# Patient Record
Sex: MALE | Race: WHITE | Employment: OTHER | URBAN - METROPOLITAN AREA
[De-identification: names, ages, dates, MRNs, and addresses within clinical notes are randomized per-mention and may not be internally consistent; named-entity substitution may affect disease eponyms.]

---

## 2021-12-03 ENCOUNTER — APPOINTMENT (OUTPATIENT)
Dept: GENERAL RADIOLOGY | Age: 86
DRG: 177 | End: 2021-12-03
Payer: COMMERCIAL

## 2021-12-03 ENCOUNTER — HOSPITAL ENCOUNTER (INPATIENT)
Age: 86
LOS: 4 days | Discharge: NURSING, REHAB OR LONG-TERM CARE | DRG: 177 | End: 2021-12-08
Attending: EMERGENCY MEDICINE | Admitting: STUDENT IN AN ORGANIZED HEALTH CARE EDUCATION/TRAINING PROGRAM
Payer: COMMERCIAL

## 2021-12-03 DIAGNOSIS — J18.9 PNEUMONIA OF LEFT LOWER LOBE DUE TO INFECTIOUS ORGANISM: ICD-10-CM

## 2021-12-03 DIAGNOSIS — U07.1 COVID: Primary | ICD-10-CM

## 2021-12-03 PROBLEM — C21.0 ANAL CANCER (HCC): Status: ACTIVE | Noted: 2021-12-03

## 2021-12-03 LAB
-: ABNORMAL
ABSOLUTE EOS #: 0 K/UL (ref 0–0.44)
ABSOLUTE IMMATURE GRANULOCYTE: 0 K/UL (ref 0–0.3)
ABSOLUTE LYMPH #: 0.25 K/UL (ref 1.1–3.7)
ABSOLUTE MONO #: 0.49 K/UL (ref 0.1–1.2)
AMORPHOUS: ABNORMAL
ANION GAP SERPL CALCULATED.3IONS-SCNC: 12 MMOL/L (ref 9–17)
BACTERIA: ABNORMAL
BASOPHILS # BLD: 0 % (ref 0–2)
BASOPHILS ABSOLUTE: 0 K/UL (ref 0–0.2)
BILIRUBIN URINE: NEGATIVE
BNP INTERPRETATION: ABNORMAL
BUN BLDV-MCNC: 43 MG/DL (ref 8–23)
BUN/CREAT BLD: 18 (ref 9–20)
CALCIUM SERPL-MCNC: 9.6 MG/DL (ref 8.6–10.4)
CASTS UA: ABNORMAL /LPF
CHLORIDE BLD-SCNC: 96 MMOL/L (ref 98–107)
CO2: 25 MMOL/L (ref 20–31)
COLOR: YELLOW
COMMENT UA: ABNORMAL
CREAT SERPL-MCNC: 2.41 MG/DL (ref 0.7–1.2)
CRYSTALS, UA: ABNORMAL /HPF
DIFFERENTIAL TYPE: ABNORMAL
EOSINOPHILS RELATIVE PERCENT: 0 % (ref 1–4)
EPITHELIAL CELLS UA: ABNORMAL /HPF (ref 0–5)
FERRITIN: 102 UG/L (ref 30–400)
GFR AFRICAN AMERICAN: 31 ML/MIN
GFR NON-AFRICAN AMERICAN: 26 ML/MIN
GFR SERPL CREATININE-BSD FRML MDRD: ABNORMAL ML/MIN/{1.73_M2}
GFR SERPL CREATININE-BSD FRML MDRD: ABNORMAL ML/MIN/{1.73_M2}
GLUCOSE BLD-MCNC: 314 MG/DL (ref 70–99)
GLUCOSE URINE: ABNORMAL
HCT VFR BLD CALC: 41 % (ref 40.7–50.3)
HEMOGLOBIN: 12.9 G/DL (ref 13–17)
IMMATURE GRANULOCYTES: 0 %
KETONES, URINE: ABNORMAL
LACTATE DEHYDROGENASE: 224 U/L (ref 135–225)
LACTIC ACID, SEPSIS WHOLE BLOOD: NORMAL MMOL/L (ref 0.5–1.9)
LACTIC ACID, SEPSIS WHOLE BLOOD: NORMAL MMOL/L (ref 0.5–1.9)
LACTIC ACID, SEPSIS: 0.9 MMOL/L (ref 0.5–1.9)
LACTIC ACID, SEPSIS: 1.5 MMOL/L (ref 0.5–1.9)
LEUKOCYTE ESTERASE, URINE: NEGATIVE
LYMPHOCYTES # BLD: 3 % (ref 24–43)
MAGNESIUM: 2.4 MG/DL (ref 1.6–2.6)
MCH RBC QN AUTO: 28.4 PG (ref 25.2–33.5)
MCHC RBC AUTO-ENTMCNC: 31.5 G/DL (ref 28.4–34.8)
MCV RBC AUTO: 90.1 FL (ref 82.6–102.9)
MONOCYTES # BLD: 6 % (ref 3–12)
MUCUS: ABNORMAL
MYOGLOBIN: 224 NG/ML (ref 28–72)
MYOGLOBIN: 247 NG/ML (ref 28–72)
NITRITE, URINE: NEGATIVE
NRBC AUTOMATED: 0 PER 100 WBC
OTHER OBSERVATIONS UA: ABNORMAL
PDW BLD-RTO: 14.8 % (ref 11.8–14.4)
PH UA: 6 (ref 5–8)
PLATELET # BLD: 262 K/UL (ref 138–453)
PLATELET ESTIMATE: ABNORMAL
PMV BLD AUTO: 9.8 FL (ref 8.1–13.5)
POTASSIUM SERPL-SCNC: 4.3 MMOL/L (ref 3.7–5.3)
PRO-BNP: 1295 PG/ML
PROTEIN UA: ABNORMAL
RBC # BLD: 4.55 M/UL (ref 4.21–5.77)
RBC # BLD: ABNORMAL 10*6/UL
RBC UA: ABNORMAL /HPF (ref 0–2)
RENAL EPITHELIAL, UA: ABNORMAL /HPF
SARS-COV-2, RAPID: DETECTED
SEG NEUTROPHILS: 91 % (ref 36–65)
SEGMENTED NEUTROPHILS ABSOLUTE COUNT: 7.46 K/UL (ref 1.5–8.1)
SODIUM BLD-SCNC: 133 MMOL/L (ref 135–144)
SPECIFIC GRAVITY UA: 1.02 (ref 1–1.03)
SPECIMEN DESCRIPTION: ABNORMAL
THYROXINE, FREE: 0.93 NG/DL (ref 0.93–1.7)
TRICHOMONAS: ABNORMAL
TROPONIN INTERP: ABNORMAL
TROPONIN INTERP: ABNORMAL
TROPONIN T: ABNORMAL NG/ML
TROPONIN T: ABNORMAL NG/ML
TROPONIN, HIGH SENSITIVITY: 33 NG/L (ref 0–22)
TROPONIN, HIGH SENSITIVITY: 34 NG/L (ref 0–22)
TSH SERPL DL<=0.05 MIU/L-ACNC: 5.22 MIU/L (ref 0.3–5)
TURBIDITY: CLEAR
URINE HGB: ABNORMAL
UROBILINOGEN, URINE: NORMAL
WBC # BLD: 8.2 K/UL (ref 3.5–11.3)
WBC # BLD: ABNORMAL 10*3/UL
WBC UA: ABNORMAL /HPF (ref 0–5)
YEAST: ABNORMAL

## 2021-12-03 PROCEDURE — 83874 ASSAY OF MYOGLOBIN: CPT

## 2021-12-03 PROCEDURE — 84443 ASSAY THYROID STIM HORMONE: CPT

## 2021-12-03 PROCEDURE — 99285 EMERGENCY DEPT VISIT HI MDM: CPT

## 2021-12-03 PROCEDURE — 83880 ASSAY OF NATRIURETIC PEPTIDE: CPT

## 2021-12-03 PROCEDURE — 83735 ASSAY OF MAGNESIUM: CPT

## 2021-12-03 PROCEDURE — 83605 ASSAY OF LACTIC ACID: CPT

## 2021-12-03 PROCEDURE — 80048 BASIC METABOLIC PNL TOTAL CA: CPT

## 2021-12-03 PROCEDURE — 82728 ASSAY OF FERRITIN: CPT

## 2021-12-03 PROCEDURE — 71045 X-RAY EXAM CHEST 1 VIEW: CPT

## 2021-12-03 PROCEDURE — 83615 LACTATE (LD) (LDH) ENZYME: CPT

## 2021-12-03 PROCEDURE — 6360000002 HC RX W HCPCS: Performed by: NURSE PRACTITIONER

## 2021-12-03 PROCEDURE — 84484 ASSAY OF TROPONIN QUANT: CPT

## 2021-12-03 PROCEDURE — 93005 ELECTROCARDIOGRAM TRACING: CPT | Performed by: NURSE PRACTITIONER

## 2021-12-03 PROCEDURE — 99222 1ST HOSP IP/OBS MODERATE 55: CPT | Performed by: NURSE PRACTITIONER

## 2021-12-03 PROCEDURE — 87040 BLOOD CULTURE FOR BACTERIA: CPT

## 2021-12-03 PROCEDURE — 2580000003 HC RX 258: Performed by: NURSE PRACTITIONER

## 2021-12-03 PROCEDURE — 86140 C-REACTIVE PROTEIN: CPT

## 2021-12-03 PROCEDURE — 96375 TX/PRO/DX INJ NEW DRUG ADDON: CPT

## 2021-12-03 PROCEDURE — 84145 PROCALCITONIN (PCT): CPT

## 2021-12-03 PROCEDURE — 96365 THER/PROPH/DIAG IV INF INIT: CPT

## 2021-12-03 PROCEDURE — 96367 TX/PROPH/DG ADDL SEQ IV INF: CPT

## 2021-12-03 PROCEDURE — 84439 ASSAY OF FREE THYROXINE: CPT

## 2021-12-03 PROCEDURE — 81001 URINALYSIS AUTO W/SCOPE: CPT

## 2021-12-03 PROCEDURE — 83036 HEMOGLOBIN GLYCOSYLATED A1C: CPT

## 2021-12-03 PROCEDURE — 87635 SARS-COV-2 COVID-19 AMP PRB: CPT

## 2021-12-03 PROCEDURE — 85025 COMPLETE CBC W/AUTO DIFF WBC: CPT

## 2021-12-03 RX ORDER — GLIMEPIRIDE 4 MG/1
4 TABLET ORAL
COMMUNITY
Start: 2021-11-12

## 2021-12-03 RX ORDER — LEVOTHYROXINE SODIUM 88 UG/1
88 TABLET ORAL DAILY
COMMUNITY
Start: 2021-10-06

## 2021-12-03 RX ORDER — TOLTERODINE 4 MG/1
4 CAPSULE, EXTENDED RELEASE ORAL DAILY
COMMUNITY
Start: 2021-07-15

## 2021-12-03 RX ORDER — GABAPENTIN 300 MG/1
CAPSULE ORAL
COMMUNITY
End: 2021-12-04

## 2021-12-03 RX ORDER — PANTOPRAZOLE SODIUM 40 MG/1
40 TABLET, DELAYED RELEASE ORAL
COMMUNITY
Start: 2021-08-30

## 2021-12-03 RX ORDER — SODIUM CHLORIDE 9 MG/ML
INJECTION, SOLUTION INTRAVENOUS CONTINUOUS
Status: DISCONTINUED | OUTPATIENT
Start: 2021-12-03 | End: 2021-12-04

## 2021-12-03 RX ORDER — DEXAMETHASONE SODIUM PHOSPHATE 10 MG/ML
6 INJECTION, SOLUTION INTRAMUSCULAR; INTRAVENOUS ONCE
Status: COMPLETED | OUTPATIENT
Start: 2021-12-03 | End: 2021-12-03

## 2021-12-03 RX ADMIN — AZITHROMYCIN MONOHYDRATE 500 MG: 500 INJECTION, POWDER, LYOPHILIZED, FOR SOLUTION INTRAVENOUS at 22:21

## 2021-12-03 RX ADMIN — DEXAMETHASONE SODIUM PHOSPHATE 6 MG: 10 INJECTION INTRAMUSCULAR; INTRAVENOUS at 22:21

## 2021-12-03 RX ADMIN — CEFTRIAXONE SODIUM 1000 MG: 1 INJECTION, POWDER, FOR SOLUTION INTRAMUSCULAR; INTRAVENOUS at 21:41

## 2021-12-03 RX ADMIN — SODIUM CHLORIDE: 9 INJECTION, SOLUTION INTRAVENOUS at 18:52

## 2021-12-03 ASSESSMENT — ENCOUNTER SYMPTOMS
ALLERGIC/IMMUNOLOGIC NEGATIVE: 1
DIARRHEA: 0
EYES NEGATIVE: 1
VOMITING: 0
COUGH: 0
ABDOMINAL PAIN: 0
WHEEZING: 0
NAUSEA: 0
SHORTNESS OF BREATH: 0

## 2021-12-03 NOTE — ED PROVIDER NOTES
[address] [place] [address] ED  EMERGENCY DEPARTMENT ENCOUNTER      Pt Name: Osito Steward  MRN: [id number]  Armsjuliangfbella 1935  Date of evaluation: 12/3/2021  Provider: Jordan Valencia       Chief Complaint   Patient presents with    Fever     per EMS    Fatigue     last 2-3 days. HISTORY OFPRESENT ILLNESS  (Location/Symptom, Timing/Onset, Context/Setting, Quality, Duration, Modifying Factors, Severity.)   Osito Steward is a 81 y.o. male who presents to the emergency department by EMS for evaluation of fatigue and generalized weakness for the past 2-3 days. EMS also reported patient had a fever. He is afebrile upon arrival.  Temp 98.1. Patient is extremely hard of hearing. 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 No acute distress upon arrival.  Denies chest pain, cough, shortness of breath, imani pain, nausea or vomiting. History of COPD, diabetes, CKD, anal cancer, renal mass, and hypothyroidism. Nursing Notes were reviewed.     PASTMEDICAL HISTORY     Past Medical History:   Diagnosis Date    Anal cancer ([address]     CKD (chronic kidney disease)     COPD (chronic obstructive pulmonary disease) (HCC)     Diabetes mellitus ([address]     Hypothyroid     Renal mass          SURGICAL HISTORY       Past Surgical History:   Procedure Laterality Date    ANUS SURGERY      CATARACT REMOVAL      COLONOSCOPY      FINGER TRIGGER RELEASE      KNEE ARTHROSCOPY      ROTATOR CUFF REPAIR      UPPER GASTROINTESTINAL ENDOSCOPY      VASECTOMY           CURRENT MEDICATIONS     Previous Medications    GABAPENTIN (NEURONTIN) 300 MG CAPSULE    1 capsule    GLIMEPIRIDE (AMARYL) 4 MG TABLET        LEVOTHYROXINE (SYNTHROID) 88 MCG TABLET        PANTOPRAZOLE (PROTONIX) 40 MG TABLET    Take 40 mg by mouth 2 times daily (before meals)    TOLTERODINE (DETROL LA) 4 MG EXTENDED RELEASE CAPSULE    TAKE ONE CAPSULE BY MOUTH DAILY       ALLERGIES     Patient has no known allergies. FAMILY HISTORY     History reviewed. No pertinent family history. SOCIAL HISTORY       Social History     Socioeconomic History    Marital status:      Spouse name: None    Number of children: None    Years of education: None    Highest education level: None   Occupational History    None   Tobacco Use    Smoking status: Former Smoker    Smokeless tobacco: Never Used   Substance and Sexual Activity    Alcohol use: Not Currently    Drug use: Never    Sexual activity: None   Other Topics Concern    None   Social History Narrative    None     Social Determinants of Health     Financial Resource Strain:     Difficulty of Paying Living Expenses: Not on file   Food Insecurity:     Worried About Running Out of Food in the Last Year: Not on file    Demetria of Food in the Last Year: Not on file   Transportation Needs:     Lack of Transportation (Medical): Not on file    Lack of Transportation (Non-Medical):  Not on file   Physical Activity:     Days of Exercise per Week: Not on file    Minutes of Exercise per Session: Not on file   Stress:     Feeling of Stress : Not on file   Social Connections:     Frequency of Communication with Friends and Family: Not on file    Frequency of Social Gatherings with Friends and Family: Not on file    Attends Zoroastrian Services: Not on file    Active Member of [address] [place] [address] or Organizations: Not on file    Attends Club or Organization Meetings: Not on file    Marital Status: Not on file   Intimate Partner Violence:     Fear of Current or Ex-Partner: Not on file    Emotionally Abused: Not on file    Physically Abused: Not on file    Sexually Abused: Not on file   Housing Stability:     Unable to Pay for Housing in the Last Year: Not on file    Number of [place] in the Last Year: Not on file    Unstable Housing in the Last Year: Not on file         REVIEW OF SYSTEMS    (2-9 systems for level 4, 10 or more for level 5)     Review of Systems   Constitutional: Positive for activity change and fatigue. Negative for appetite change, chills and fever. HENT: Negative. Eyes: Negative. Respiratory: Negative for cough, shortness of breath and wheezing. Cardiovascular: Negative for chest pain and leg swelling. Gastrointestinal: Negative for abdominal pain, diarrhea, nausea and vomiting. Genitourinary: Negative for dysuria. Musculoskeletal: Positive for gait problem and myalgias. Skin: Positive for pallor. Allergic/Immunologic: Negative. Neurological: Positive for weakness. Negative for dizziness, light-headedness and headaches. Generalized weakness, fatigue   Hematological: Negative. Psychiatric/Behavioral: Negative. All other systems reviewed and are negative. Except as noted above the remainder of the review of systems was reviewed and negative. PHYSICAL EXAM    (up to 7 for level 4, 8 or more for level 5)     ED Triage Vitals   BP Temp Temp Source Pulse Resp SpO2 Height Weight   12/03/21 1813 12/03/21 1807 12/03/21 1807 12/03/21 1807 12/03/21 1807 12/03/21 1807 12/03/21 1807 12/03/21 1807   (!) 184/78 98.1 °F (36.7 °C) Oral 85 26 97 % 5' 10\" (1.778 m) 138 lb (62.6 kg)       Physical Exam  Constitutional:       General: He is not in acute distress. Appearance: Normal appearance. He is well-developed, well-groomed and underweight. HENT:      Head: Normocephalic. Right Ear: External ear normal.      Left Ear: External ear normal.      Nose: Nose normal.      Mouth/Throat:      Mouth: Mucous membranes are dry. Eyes:      Extraocular Movements: Extraocular movements intact. Conjunctiva/sclera: Conjunctivae normal.      Pupils: Pupils are equal, round, and reactive to light. Cardiovascular:      Rate and Rhythm: Normal rate and regular rhythm. Pulses: Normal pulses. Heart sounds: Normal heart sounds.    Pulmonary:      Effort: Pulmonary effort is normal.      Breath sounds: Normal breath sounds. No decreased breath sounds, wheezing, rhonchi or rales. Abdominal:      General: Bowel sounds are normal.      Palpations: Abdomen is soft. Tenderness: There is no abdominal tenderness. There is no guarding or rebound. Musculoskeletal:         General: Normal range of motion. Cervical back: Normal range of motion and neck supple. Right lower leg: No edema. Left lower leg: No edema. Skin:     General: Skin is warm and dry. Capillary Refill: Capillary refill takes less than 2 seconds. Coloration: Skin is pale. Neurological:      Mental Status: He is alert and oriented to person, place, and time. Psychiatric:         Mood and Affect: Mood normal.         Behavior: Behavior normal.         Thought Content: Thought content normal.         Judgment: Judgment normal.           DIAGNOSTIC RESULTS     EKG:All EKG's are interpreted by the Emergency Department Physician who either signs or Co-signs this chart in the absence of a cardiologist.    EKG interpreted by attending physician    RADIOLOGY:   Non-plain film images such as CT, Ultrasound and MRI are read by theradiologist. Plain radiographic images are visualized and preliminarily interpreted by the emergency physician with the below findings:    XR CHEST PORTABLE    Result Date: 12/3/2021  EXAMINATION: ONE XRAY VIEW OF THE CHEST 12/3/2021 6:23 pm COMPARISON: None. HISTORY: ORDERING SYSTEM PROVIDED HISTORY: Fatigue TECHNOLOGIST PROVIDED HISTORY: Fatigue Reason for Exam: Fever, fatigue Acuity: Acute Type of Exam: Initial FINDINGS: Heart size is within normal limits for technique. No pleural effusion or pneumothorax is seen. Mild left basilar airspace opacities are seen. Mild left basilar airspace opacities which could represent atelectasis and/or infiltrate.      Interpretation per the Radiologist below, if available at the time of this note:    XR CHEST PORTABLE   Preliminary Result   Mild left basilar airspace as of this dictation. EMERGENCY DEPARTMENT COURSE andDIFFERENTIAL DIAGNOSIS/MDM:   Patient evaluated in conjunction with attending physician. Patient afebrile upon arrival.  SPO2 98% on room air. No acute distress. Chest x-ray shows mild left airspace basilar opacities. Rapid Covid positive. Labs reviewed. WBC 8.2. Lactic acid 0.9. Blood cultures obtained. Creatinine 2.41. Potassium 4.3. Urinalysis does not show infection. Patient not hypotensive. RN attempted to ambulate the patient but he was so weak he could not get out of bed. Patient lives at home with his wife. Patient's daughter Omar Flower who is the patient's POA helps take care of him. Omar Flower did see the patient in the emergency department to see the patient. Dr. Татьяна Cruz and I discussed with Omar Flower patient's test results, diagnosis, treatment and admission to the hospital.  The patient and Omar Flower were agreeable to this. She would like to be updated on any medications given to the patient for Covid. Her number is [phone]. I spoke with Dr. Tucker Matta with [facility] who accepts patient for admission. Will add on LDH, ferritin, and CRP. Patient also started on azithromycin and Rocephin in the ED. IV fluids [email or web address]. I did speak with Omar Flower later and informed her patient would be given Decadron for Covid and she was agreeable to this. CRITICAL CARE TIME     Due to the high probability of sudden and clinically significant deterioration in the patient's condition he required highest level of my preparedness to intervene urgently. I provided critical care time including documentation time, medication orders and management, reevaluation, vital sign assessment, ordering and reviewing of of lab tests ordering and reviewing of x-ray studies, and admission orders.  Aggregate critical care time is 35 minutes including only time during which I was engaged in work directly related to his care and did not include time spent treating other patients simultaneously. Vitals:    Vitals:    12/03/21 1813 12/03/21 1845 12/03/21 1900 12/03/21 1943   BP: (!) 184/78 (!) 189/85 (!) 195/69 (!) 182/85   Pulse:  100 79    Resp:  30 26    Temp:       TempSrc:       SpO2:  94% 96%    Weight:       Height:             CONSULTS:  IP CONSULT TO INTERNAL MEDICINE    RES:  Procedures    FINAL IMPRESSION      1. COVID    2. Pneumonia of left lower lobe due to infectious organism          DISPOSITION/PLAN   DISPOSITION Decision To Admit 12/03/2021 08:05:59 PM      PATIENT REFERRED TO:   No follow-up provider specified.     DISCHARGE MEDICATIONS:     New Prescriptions    No medications on file     Electronically signed by CHRISTIANO Nguyen 12/3/2021 at 10:05 PM           CHRISTIANO Nguyen CNP  12/03/21 2213       CHRISTIANO Nguyen CNP  12/03/21 2214

## 2021-12-04 ENCOUNTER — APPOINTMENT (OUTPATIENT)
Dept: GENERAL RADIOLOGY | Age: 86
DRG: 177 | End: 2021-12-04
Payer: COMMERCIAL

## 2021-12-04 ENCOUNTER — APPOINTMENT (OUTPATIENT)
Dept: CT IMAGING | Age: 86
DRG: 177 | End: 2021-12-04
Payer: COMMERCIAL

## 2021-12-04 PROBLEM — J18.9 PNEUMONIA OF LEFT LOWER LOBE DUE TO INFECTIOUS ORGANISM: Status: ACTIVE | Noted: 2021-12-04

## 2021-12-04 PROBLEM — K21.00 GASTROESOPHAGEAL REFLUX DISEASE WITH ESOPHAGITIS: Status: ACTIVE | Noted: 2019-01-02

## 2021-12-04 PROBLEM — J44.9 COPD, MILD (HCC): Status: ACTIVE | Noted: 2018-06-12

## 2021-12-04 PROBLEM — N28.9 RENAL INSUFFICIENCY SYNDROME: Status: ACTIVE | Noted: 2019-04-04

## 2021-12-04 LAB
ABSOLUTE EOS #: 0 K/UL (ref 0–0.44)
ABSOLUTE IMMATURE GRANULOCYTE: 0 K/UL (ref 0–0.3)
ABSOLUTE LYMPH #: 0.35 K/UL (ref 1.1–3.7)
ABSOLUTE MONO #: 0.18 K/UL (ref 0.1–1.2)
ANION GAP SERPL CALCULATED.3IONS-SCNC: 13 MMOL/L (ref 9–17)
BASOPHILS # BLD: 0 % (ref 0–2)
BASOPHILS ABSOLUTE: 0 K/UL (ref 0–0.2)
BUN BLDV-MCNC: 42 MG/DL (ref 8–23)
BUN/CREAT BLD: 17 (ref 9–20)
C-REACTIVE PROTEIN: 112 MG/L (ref 0–5)
C-REACTIVE PROTEIN: 125.6 MG/L (ref 0–5)
CALCIUM SERPL-MCNC: 9 MG/DL (ref 8.6–10.4)
CHLORIDE BLD-SCNC: 100 MMOL/L (ref 98–107)
CO2: 24 MMOL/L (ref 20–31)
CREAT SERPL-MCNC: 2.46 MG/DL (ref 0.7–1.2)
DIFFERENTIAL TYPE: ABNORMAL
EKG ATRIAL RATE: 79 BPM
EKG P AXIS: 43 DEGREES
EKG P-R INTERVAL: 186 MS
EKG Q-T INTERVAL: 356 MS
EKG QRS DURATION: 92 MS
EKG QTC CALCULATION (BAZETT): 408 MS
EKG R AXIS: 46 DEGREES
EKG T AXIS: 65 DEGREES
EKG VENTRICULAR RATE: 79 BPM
EOSINOPHILS RELATIVE PERCENT: 0 % (ref 1–4)
ESTIMATED AVERAGE GLUCOSE: 180 MG/DL
GFR AFRICAN AMERICAN: 30 ML/MIN
GFR NON-AFRICAN AMERICAN: 25 ML/MIN
GFR SERPL CREATININE-BSD FRML MDRD: ABNORMAL ML/MIN/{1.73_M2}
GFR SERPL CREATININE-BSD FRML MDRD: ABNORMAL ML/MIN/{1.73_M2}
GLUCOSE BLD-MCNC: 168 MG/DL (ref 70–99)
GLUCOSE BLD-MCNC: 169 MG/DL (ref 75–110)
GLUCOSE BLD-MCNC: 176 MG/DL (ref 75–110)
GLUCOSE BLD-MCNC: 211 MG/DL (ref 75–110)
GLUCOSE BLD-MCNC: 230 MG/DL (ref 75–110)
GLUCOSE BLD-MCNC: 281 MG/DL (ref 75–110)
GLUCOSE BLD-MCNC: 295 MG/DL (ref 75–110)
HBA1C MFR BLD: 7.9 % (ref 4–6)
HCT VFR BLD CALC: 38.3 % (ref 40.7–50.3)
HEMOGLOBIN: 12.1 G/DL (ref 13–17)
IMMATURE GRANULOCYTES: 0 %
LYMPHOCYTES # BLD: 4 % (ref 24–43)
MCH RBC QN AUTO: 28.5 PG (ref 25.2–33.5)
MCHC RBC AUTO-ENTMCNC: 31.6 G/DL (ref 28.4–34.8)
MCV RBC AUTO: 90.1 FL (ref 82.6–102.9)
MONOCYTES # BLD: 2 % (ref 3–12)
NRBC AUTOMATED: 0 PER 100 WBC
PDW BLD-RTO: 14.7 % (ref 11.8–14.4)
PLATELET # BLD: 234 K/UL (ref 138–453)
PLATELET ESTIMATE: ABNORMAL
PMV BLD AUTO: 9.7 FL (ref 8.1–13.5)
POTASSIUM SERPL-SCNC: 4 MMOL/L (ref 3.7–5.3)
PROCALCITONIN: 0.29 NG/ML
RBC # BLD: 4.25 M/UL (ref 4.21–5.77)
RBC # BLD: ABNORMAL 10*6/UL
SEG NEUTROPHILS: 94 % (ref 36–65)
SEGMENTED NEUTROPHILS ABSOLUTE COUNT: 8.27 K/UL (ref 1.5–8.1)
SODIUM BLD-SCNC: 137 MMOL/L (ref 135–144)
WBC # BLD: 8.8 K/UL (ref 3.5–11.3)
WBC # BLD: ABNORMAL 10*3/UL

## 2021-12-04 PROCEDURE — 6360000002 HC RX W HCPCS: Performed by: STUDENT IN AN ORGANIZED HEALTH CARE EDUCATION/TRAINING PROGRAM

## 2021-12-04 PROCEDURE — 99232 SBSQ HOSP IP/OBS MODERATE 35: CPT | Performed by: STUDENT IN AN ORGANIZED HEALTH CARE EDUCATION/TRAINING PROGRAM

## 2021-12-04 PROCEDURE — 85025 COMPLETE CBC W/AUTO DIFF WBC: CPT

## 2021-12-04 PROCEDURE — 6370000000 HC RX 637 (ALT 250 FOR IP): Performed by: INTERNAL MEDICINE

## 2021-12-04 PROCEDURE — 6370000000 HC RX 637 (ALT 250 FOR IP): Performed by: NURSE PRACTITIONER

## 2021-12-04 PROCEDURE — 2580000003 HC RX 258: Performed by: INTERNAL MEDICINE

## 2021-12-04 PROCEDURE — 6360000002 HC RX W HCPCS: Performed by: NURSE PRACTITIONER

## 2021-12-04 PROCEDURE — 99497 ADVNCD CARE PLAN 30 MIN: CPT | Performed by: STUDENT IN AN ORGANIZED HEALTH CARE EDUCATION/TRAINING PROGRAM

## 2021-12-04 PROCEDURE — 80048 BASIC METABOLIC PNL TOTAL CA: CPT

## 2021-12-04 PROCEDURE — 73010 X-RAY EXAM OF SHOULDER BLADE: CPT

## 2021-12-04 PROCEDURE — 86140 C-REACTIVE PROTEIN: CPT

## 2021-12-04 PROCEDURE — 36415 COLL VENOUS BLD VENIPUNCTURE: CPT

## 2021-12-04 PROCEDURE — 2060000000 HC ICU INTERMEDIATE R&B

## 2021-12-04 PROCEDURE — 73030 X-RAY EXAM OF SHOULDER: CPT

## 2021-12-04 PROCEDURE — 2580000003 HC RX 258: Performed by: STUDENT IN AN ORGANIZED HEALTH CARE EDUCATION/TRAINING PROGRAM

## 2021-12-04 PROCEDURE — 82947 ASSAY GLUCOSE BLOOD QUANT: CPT

## 2021-12-04 PROCEDURE — 6360000002 HC RX W HCPCS: Performed by: INTERNAL MEDICINE

## 2021-12-04 PROCEDURE — 2500000003 HC RX 250 WO HCPCS: Performed by: STUDENT IN AN ORGANIZED HEALTH CARE EDUCATION/TRAINING PROGRAM

## 2021-12-04 PROCEDURE — 71250 CT THORAX DX C-: CPT

## 2021-12-04 RX ORDER — ACETAMINOPHEN 325 MG/1
650 TABLET ORAL EVERY 4 HOURS PRN
Status: DISCONTINUED | OUTPATIENT
Start: 2021-12-04 | End: 2021-12-04 | Stop reason: SDUPTHER

## 2021-12-04 RX ORDER — ACETAMINOPHEN 650 MG/1
650 SUPPOSITORY RECTAL EVERY 6 HOURS PRN
Status: DISCONTINUED | OUTPATIENT
Start: 2021-12-04 | End: 2021-12-08 | Stop reason: HOSPADM

## 2021-12-04 RX ORDER — SODIUM CHLORIDE 0.9 % (FLUSH) 0.9 %
5-40 SYRINGE (ML) INJECTION EVERY 12 HOURS SCHEDULED
Status: DISCONTINUED | OUTPATIENT
Start: 2021-12-04 | End: 2021-12-08 | Stop reason: HOSPADM

## 2021-12-04 RX ORDER — LABETALOL HYDROCHLORIDE 5 MG/ML
10 INJECTION, SOLUTION INTRAVENOUS EVERY 6 HOURS PRN
Status: DISCONTINUED | OUTPATIENT
Start: 2021-12-04 | End: 2021-12-07

## 2021-12-04 RX ORDER — SODIUM CHLORIDE 9 MG/ML
100 INJECTION, SOLUTION INTRAVENOUS CONTINUOUS PRN
Status: DISCONTINUED | OUTPATIENT
Start: 2021-12-04 | End: 2021-12-08 | Stop reason: HOSPADM

## 2021-12-04 RX ORDER — HEPARIN SODIUM 5000 [USP'U]/ML
5000 INJECTION, SOLUTION INTRAVENOUS; SUBCUTANEOUS EVERY 8 HOURS SCHEDULED
Status: DISCONTINUED | OUTPATIENT
Start: 2021-12-04 | End: 2021-12-08 | Stop reason: HOSPADM

## 2021-12-04 RX ORDER — VITAMIN B COMPLEX
2000 TABLET ORAL DAILY
Status: DISCONTINUED | OUTPATIENT
Start: 2021-12-04 | End: 2021-12-08 | Stop reason: HOSPADM

## 2021-12-04 RX ORDER — ACETAMINOPHEN 325 MG/1
650 TABLET ORAL EVERY 6 HOURS PRN
Status: DISCONTINUED | OUTPATIENT
Start: 2021-12-04 | End: 2021-12-08 | Stop reason: HOSPADM

## 2021-12-04 RX ORDER — DIPHENHYDRAMINE HYDROCHLORIDE 50 MG/ML
50 INJECTION INTRAMUSCULAR; INTRAVENOUS
Status: ACTIVE | OUTPATIENT
Start: 2021-12-04 | End: 2021-12-04

## 2021-12-04 RX ORDER — SODIUM CHLORIDE 9 MG/ML
25 INJECTION, SOLUTION INTRAVENOUS PRN
Status: DISCONTINUED | OUTPATIENT
Start: 2021-12-04 | End: 2021-12-08 | Stop reason: HOSPADM

## 2021-12-04 RX ORDER — TROSPIUM CHLORIDE 20 MG/1
20 TABLET, FILM COATED ORAL NIGHTLY
Status: DISCONTINUED | OUTPATIENT
Start: 2021-12-04 | End: 2021-12-08 | Stop reason: HOSPADM

## 2021-12-04 RX ORDER — DEXAMETHASONE SODIUM PHOSPHATE 10 MG/ML
6 INJECTION, SOLUTION INTRAMUSCULAR; INTRAVENOUS EVERY 6 HOURS
Status: DISCONTINUED | OUTPATIENT
Start: 2021-12-04 | End: 2021-12-04

## 2021-12-04 RX ORDER — METHYLPREDNISOLONE SODIUM SUCCINATE 125 MG/2ML
125 INJECTION, POWDER, LYOPHILIZED, FOR SOLUTION INTRAMUSCULAR; INTRAVENOUS
Status: ACTIVE | OUTPATIENT
Start: 2021-12-04 | End: 2021-12-04

## 2021-12-04 RX ORDER — PANTOPRAZOLE SODIUM 40 MG/1
40 TABLET, DELAYED RELEASE ORAL
Status: DISCONTINUED | OUTPATIENT
Start: 2021-12-04 | End: 2021-12-08 | Stop reason: HOSPADM

## 2021-12-04 RX ORDER — EPINEPHRINE 1 MG/ML
0.3 INJECTION, SOLUTION, CONCENTRATE INTRAVENOUS PRN
Status: DISCONTINUED | OUTPATIENT
Start: 2021-12-04 | End: 2021-12-08 | Stop reason: HOSPADM

## 2021-12-04 RX ORDER — SODIUM CHLORIDE 0.9 % (FLUSH) 0.9 %
5-40 SYRINGE (ML) INJECTION PRN
Status: DISCONTINUED | OUTPATIENT
Start: 2021-12-04 | End: 2021-12-08 | Stop reason: HOSPADM

## 2021-12-04 RX ORDER — ONDANSETRON 2 MG/ML
4 INJECTION INTRAMUSCULAR; INTRAVENOUS EVERY 6 HOURS PRN
Status: DISCONTINUED | OUTPATIENT
Start: 2021-12-04 | End: 2021-12-08 | Stop reason: HOSPADM

## 2021-12-04 RX ORDER — ONDANSETRON 4 MG/1
4 TABLET, ORALLY DISINTEGRATING ORAL EVERY 8 HOURS PRN
Status: DISCONTINUED | OUTPATIENT
Start: 2021-12-04 | End: 2021-12-08 | Stop reason: HOSPADM

## 2021-12-04 RX ORDER — SODIUM CHLORIDE 9 MG/ML
20 INJECTION, SOLUTION INTRAVENOUS CONTINUOUS PRN
Status: ACTIVE | OUTPATIENT
Start: 2021-12-04 | End: 2021-12-05

## 2021-12-04 RX ORDER — HYDRALAZINE HYDROCHLORIDE 20 MG/ML
5 INJECTION INTRAMUSCULAR; INTRAVENOUS EVERY 6 HOURS PRN
Status: DISCONTINUED | OUTPATIENT
Start: 2021-12-04 | End: 2021-12-07

## 2021-12-04 RX ORDER — LEVOTHYROXINE SODIUM 88 UG/1
88 TABLET ORAL DAILY
Status: DISCONTINUED | OUTPATIENT
Start: 2021-12-04 | End: 2021-12-08 | Stop reason: HOSPADM

## 2021-12-04 RX ADMIN — PANTOPRAZOLE SODIUM 40 MG: 40 TABLET, DELAYED RELEASE ORAL at 18:48

## 2021-12-04 RX ADMIN — SODIUM CHLORIDE, PRESERVATIVE FREE 10 ML: 5 INJECTION INTRAVENOUS at 22:42

## 2021-12-04 RX ADMIN — TROSPIUM CHLORIDE 20 MG: 20 TABLET, FILM COATED ORAL at 22:42

## 2021-12-04 RX ADMIN — INSULIN LISPRO 3 UNITS: 100 INJECTION, SOLUTION INTRAVENOUS; SUBCUTANEOUS at 01:14

## 2021-12-04 RX ADMIN — HEPARIN SODIUM 5000 UNITS: 5000 INJECTION INTRAVENOUS; SUBCUTANEOUS at 22:48

## 2021-12-04 RX ADMIN — HEPARIN SODIUM 5000 UNITS: 5000 INJECTION INTRAVENOUS; SUBCUTANEOUS at 14:32

## 2021-12-04 RX ADMIN — PANTOPRAZOLE SODIUM 40 MG: 40 TABLET, DELAYED RELEASE ORAL at 09:08

## 2021-12-04 RX ADMIN — LABETALOL HYDROCHLORIDE 10 MG: 5 INJECTION INTRAVENOUS at 17:42

## 2021-12-04 RX ADMIN — ACETAMINOPHEN 650 MG: 325 TABLET ORAL at 01:14

## 2021-12-04 RX ADMIN — INSULIN LISPRO 6 UNITS: 100 INJECTION, SOLUTION INTRAVENOUS; SUBCUTANEOUS at 10:49

## 2021-12-04 RX ADMIN — INSULIN LISPRO 2 UNITS: 100 INJECTION, SOLUTION INTRAVENOUS; SUBCUTANEOUS at 18:48

## 2021-12-04 RX ADMIN — INSULIN LISPRO 4 UNITS: 100 INJECTION, SOLUTION INTRAVENOUS; SUBCUTANEOUS at 13:38

## 2021-12-04 RX ADMIN — INSULIN LISPRO 2 UNITS: 100 INJECTION, SOLUTION INTRAVENOUS; SUBCUTANEOUS at 22:19

## 2021-12-04 RX ADMIN — DEXAMETHASONE SODIUM PHOSPHATE 6 MG: 10 INJECTION, SOLUTION INTRAMUSCULAR; INTRAVENOUS at 09:08

## 2021-12-04 RX ADMIN — Medication 2000 UNITS: at 14:33

## 2021-12-04 RX ADMIN — HEPARIN SODIUM 5000 UNITS: 5000 INJECTION INTRAVENOUS; SUBCUTANEOUS at 05:52

## 2021-12-04 RX ADMIN — CASIRIVIMAB AND IMDEVIMAB: 600; 600 INJECTION, SOLUTION, CONCENTRATE INTRAVENOUS at 17:38

## 2021-12-04 RX ADMIN — AZITHROMYCIN MONOHYDRATE 500 MG: 500 INJECTION, POWDER, LYOPHILIZED, FOR SOLUTION INTRAVENOUS at 22:41

## 2021-12-04 RX ADMIN — INSULIN LISPRO 4 UNITS: 100 INJECTION, SOLUTION INTRAVENOUS; SUBCUTANEOUS at 05:52

## 2021-12-04 RX ADMIN — LEVOTHYROXINE SODIUM 88 MCG: 0.09 TABLET ORAL at 09:08

## 2021-12-04 ASSESSMENT — PAIN SCALES - GENERAL
PAINLEVEL_OUTOF10: 0
PAINLEVEL_OUTOF10: 3
PAINLEVEL_OUTOF10: 0

## 2021-12-04 ASSESSMENT — ENCOUNTER SYMPTOMS
BLOOD IN STOOL: 0
CONSTIPATION: 0
COUGH: 0
CHEST TIGHTNESS: 0
NAUSEA: 0
SHORTNESS OF BREATH: 0
DIARRHEA: 0
WHEEZING: 0
ABDOMINAL PAIN: 0
VOMITING: 0

## 2021-12-04 NOTE — PROGRESS NOTES
Patient's daughter who is Tavo Calvo would like to be updated on medications given to the patient in regards to Covid. Her number is [phone]. I did call and speak with her and notified her patient will be started on antibiotics as well as given a dose of IV Decadron. She is agreeable to this.

## 2021-12-04 NOTE — H&P
[facility]  Office: [address], DO, Cindy Ramirez, DO, Juan Ledezma, DO, Ayana Bautista, DO, Paresh Rodriguez MD, Aaliyah Linda MD, Melany Mackenzie MD, Aubrey Hoover MD, Jose Antonio Scherer MD, Alfred Canales MD, Tavo Narvaez MD, Amie Garcia, DO, Endy Reeves, DO, Delvis De La Torre MD,  Tania Alexander DO, Ariana Martinez MD, Karlee Mg MD, Sally Fitzpatrick MD, Mica Faye MD, Joe Lo MD, Noel Holloway MD, Isamar Maguire MD, Cindy Davila, [facility], [facility], CNP, Braydon Odom, CNP, Gilbert Spears, CNS, Vicente Han, CNP, Taylor Morales, CNP, Juma Card, CNP, Lj Otero, CNP, Gregg Mckeon, CNP, Sampson Joyce PA-C, Rani Olguin, [facility], Ragini Bar, CNP, Nino Mckeon, CNP, Cody Car, CNP, Yvan Whiteo, CNP, Benoel Eye, CNP, Joana Distel, CNP, Seb Duron, CNP         [facility]    HISTORY AND PHYSICAL EXAMINATION            Date:   12/3/2021  Patient name:  Gordy Chacon  Date of admission:  12/3/2021  5:56 PM  MRN:   [id number]  Account:  [de-identified]  YOB: 1935  PCP:    Bijan Christina  Room:   [place] [address]  Code Status:    No Order    Chief Complaint:     Chief Complaint   Patient presents with    Fever     per EMS    Fatigue     last 2-3 days. History Obtained From:     Patient and electronic medical record. History of Present Illness:     Gordy Chacon is a 81 y.o. Non- / non  male who presents with Fever (per EMS) and Fatigue (last 2-3 days. )   and is admitted to the hospital for the management of Pneumonia of left lower lobe due to infectious organism. The patient presents to hospital with complaint of weakness and fatigue. He reports pain to his left shoulder that he describes as constant, dull, aching and moderate in intensity. He denies fall or injury. The patient is a poor historian and has difficulty elaborating on his condition.  He is oriented to self and place, he is disoriented to year. He has no other specific complaint. He denies fever, chills or shortness of breath. He is currently 97% on room air. He has past medical history that includes anal cancer, CKD, renal mass, COPD, diabetes and hypothyroidism. He is unsure of his vaccination status. Of note, reportedly the patient's daughter who is his POA Geoff Zhao) wants to be informed of any treatments for COVID-19. I spoke with the ER nurse practitioner who states his daughter is amicable to treatment with steroids and antibiotics. Creatinine 2.41, glucose 314, TSH 5.22, proBNP 1295, . Rapid SARS-CoV-2 positive. CXR shows: Mild left basilar airspace opacities which could represent atelectasis and/or infiltrate. Chart review indicates he follows with Dr. Alfonso Hernadnes with colorectal surgery, Dr. Teodora Hargrove with oncology and Dr. Luis F Starkey with nephrology. Past Medical History:     Past Medical History:   Diagnosis Date    [facility]     CKD (chronic kidney disease)     COPD (chronic obstructive pulmonary disease) ([facility]     Diabetes mellitus ([facility]     Hypothyroid     Renal mass         Past Surgical History:     Past Surgical History:   Procedure Laterality Date    ANUS SURGERY      CATARACT REMOVAL      COLONOSCOPY      FINGER TRIGGER RELEASE      KNEE ARTHROSCOPY      ROTATOR CUFF REPAIR      UPPER GASTROINTESTINAL ENDOSCOPY      VASECTOMY          Medications Prior to Admission:     Prior to Admission medications    Medication Sig Start Date End Date Taking?  Authorizing Provider   pantoprazole (PROTONIX) 40 MG tablet Take 40 mg by mouth 2 times daily (before meals) 8/30/21  Yes Historical Provider, MD   tolterodine (DETROL LA) 4 MG extended release capsule TAKE ONE CAPSULE BY MOUTH DAILY 7/15/21  Yes Historical Provider, MD   levothyroxine (SYNTHROID) 88 MCG tablet  10/6/21   Historical Provider, MD   glimepiride (AMARYL) 4 MG tablet  11/12/21   Historical Provider, MD Head: Normocephalic and atraumatic. Right Ear: Hearing normal.      Left Ear: Hearing normal.      Nose: Nose normal. No rhinorrhea. Eyes:      General: Lids are normal.      Extraocular Movements:      Right eye: Normal extraocular motion. Left eye: Normal extraocular motion. Conjunctiva/sclera: Conjunctivae normal.      Right eye: Right conjunctiva is not injected. Left eye: Left conjunctiva is not injected. Pupils: Pupils are equal, round, and reactive to light. Pupils are equal.      Right eye: Pupil is reactive. Left eye: Pupil is reactive. Neck:      Thyroid: No thyromegaly. Trachea: Trachea normal. No tracheal deviation. Cardiovascular:      Rate and Rhythm: Normal rate and regular rhythm. Pulses: Normal pulses. Heart sounds: Normal heart sounds. Pulmonary:      Effort: Pulmonary effort is normal.      Breath sounds: Examination of the right-lower field reveals decreased breath sounds. Examination of the left-lower field reveals decreased breath sounds. Decreased breath sounds present. Abdominal:      General: Bowel sounds are normal. There is no distension. Palpations: Abdomen is soft. There is no mass. Tenderness: There is no abdominal tenderness. There is no guarding. Musculoskeletal:         General: No tenderness. Left shoulder: Bony tenderness present. Cervical back: Neck supple. Skin:     General: Skin is warm and dry. Neurological:      Mental Status: He is alert. He is disoriented. GCS: GCS eye subscore is 4. GCS verbal subscore is 4. GCS motor subscore is 6. Cranial Nerves: No cranial nerve deficit.    Psychiatric:         Speech: Speech normal.         Behavior: Behavior normal.         Investigations:      Laboratory Testing:  Recent Results (from the past 24 hour(s))   CBC with DIFF    Collection Time: 12/03/21  6:30 PM   Result Value Ref Range    WBC 8.2 3.5 - 11.3 k/uL    RBC 4.55 4.21 - 5.77 m/uL Hemoglobin 12.9 (L) 13.0 - 17.0 g/dL    Hematocrit 41.0 40.7 - 50.3 %    MCV 90.1 82.6 - 102.9 fL    MCH 28.4 25.2 - 33.5 pg    MCHC 31.5 28.4 - 34.8 g/dL    RDW 14.8 (H) 11.8 - 14.4 %    Platelets 894 715 - 128 k/uL    MPV 9.8 8.1 - 13.5 fL    NRBC Automated 0.0 0.0 per 100 WBC    Differential Type NOT REPORTED     WBC Morphology NOT REPORTED     RBC Morphology NOT REPORTED     Platelet Estimate NOT REPORTED     Seg Neutrophils 91 (H) 36 - 65 %    Lymphocytes 3 (L) 24 - 43 %    Monocytes 6 3 - 12 %    Eosinophils % 0 (L) 1 - 4 %    Basophils 0 0 - 2 %    Immature Granulocytes 0 0 %    Segs Absolute 7.46 1.50 - 8.10 k/uL    Absolute Lymph # 0.25 (L) 1.10 - 3.70 k/uL    Absolute Mono # 0.49 0.10 - 1.20 k/uL    Absolute Eos # 0.00 0.00 - 0.44 k/uL    Basophils Absolute 0.00 0.00 - 0.20 k/uL    Absolute Immature Granulocyte 0.00 0.00 - 0.30 k/uL   Basic Metabolic Prof    Collection Time: 12/03/21  6:30 PM   Result Value Ref Range    Glucose 314 (H) 70 - 99 mg/dL    BUN 43 (H) 8 - 23 mg/dL    CREATININE 2.41 (H) 0.70 - 1.20 mg/dL    Bun/Cre Ratio 18 9 - 20    Calcium 9.6 8.6 - 10.4 mg/dL    Sodium 133 (L) 135 - 144 mmol/L    Potassium 4.3 3.7 - 5.3 mmol/L    Chloride 96 (L) 98 - 107 mmol/L    CO2 25 20 - 31 mmol/L    Anion Gap 12 9 - 17 mmol/L    GFR Non-African American 26 (L) >60 mL/min    GFR  31 (L) >60 mL/min    GFR Comment          GFR Staging NOT REPORTED    Trop/Myoglobin    Collection Time: 12/03/21  6:30 PM   Result Value Ref Range    Troponin, High Sensitivity 33 (H) 0 - 22 ng/L    Troponin T NOT REPORTED <0.03 ng/mL    Troponin Interp NOT REPORTED     Myoglobin 224 (H) 28 - 72 ng/mL   Brain Natriuretic Peptide    Collection Time: 12/03/21  6:30 PM   Result Value Ref Range    Pro-BNP 1,295 (H) <300 pg/mL    BNP Interpretation NOT REPORTED    Magnesium    Collection Time: 12/03/21  6:30 PM   Result Value Ref Range    Magnesium 2.4 1.6 - 2.6 mg/dL   COVID-19, Rapid    Collection Time: 12/03/21 6:30 PM    Specimen: Nasopharyngeal Swab   Result Value Ref Range    Specimen Description . NASOPHARYNGEAL SWAB     SARS-CoV-2, Rapid DETECTED (A) Not Detected   Lactate, Sepsis    Collection Time: 12/03/21  6:30 PM   Result Value Ref Range    Lactic Acid, Sepsis 1.5 0.5 - 1.9 mmol/L    Lactic Acid, Sepsis, Whole Blood NOT REPORTED 0.5 - 1.9 mmol/L   TSH with Reflex    Collection Time: 12/03/21  6:30 PM   Result Value Ref Range    TSH 5.22 (H) 0.30 - 5.00 mIU/L   T4, Free    Collection Time: 12/03/21  6:30 PM   Result Value Ref Range    Thyroxine, Free 0.93 0.93 - 1.70 ng/dL   EKG 12 Lead    Collection Time: 12/03/21  6:42 PM   Result Value Ref Range    Ventricular Rate 79 BPM    Atrial Rate 79 BPM    P-R Interval 186 ms    QRS Duration 92 ms    Q-T Interval 356 ms    QTc Calculation (Bazett) 408 ms    P Axis 43 degrees    R Axis 46 degrees    T Axis 65 degrees   Urinalysis Reflex to Culture    Collection Time: 12/03/21  7:59 PM    Specimen: Urine, clean catch   Result Value Ref Range    Color, UA Yellow Yellow    Turbidity UA Clear Clear    Glucose, Ur 3+ (A) NEGATIVE    Bilirubin Urine NEGATIVE NEGATIVE    Ketones, Urine TRACE (A) NEGATIVE    Specific Gravity, UA 1.025 1.005 - 1.030    Urine Hgb 2+ (A) NEGATIVE    pH, UA 6.0 5.0 - 8.0    Protein, UA 2+ (A) NEGATIVE    Urobilinogen, Urine Normal Normal    Nitrite, Urine NEGATIVE NEGATIVE    Leukocyte Esterase, Urine NEGATIVE NEGATIVE    Urinalysis Comments NOT REPORTED    Microscopic Urinalysis    Collection Time: 12/03/21  7:59 PM   Result Value Ref Range    -          WBC, UA 0 TO 2 0 - 5 /HPF    RBC, UA 0 TO 2 0 - 2 /HPF    Casts UA 0 TO 2 /LPF    Casts UA HYALINE /LPF    Casts UA 0 TO 2 /LPF    Casts UA FINE GRANULAR /LPF    Casts UA 0 TO 2 /LPF    Casts UA COARSELY GRANULAR /LPF    Crystals, UA NOT REPORTED None /HPF    Epithelial Cells UA 0 TO 2 0 - 5 /HPF    Renal Epithelial, UA NOT REPORTED 0 /HPF    Bacteria, UA RARE (A) None    Mucus, UA NOT REPORTED None Trichomonas, UA NOT REPORTED None    Amorphous, UA 1+ (A) None    Other Observations UA NOT REPORTED NOT REQ. Yeast, UA NOT REPORTED None   Trop/Myoglobin    Collection Time: 12/03/21  9:20 PM   Result Value Ref Range    Troponin, High Sensitivity 34 (H) 0 - 22 ng/L    Troponin T NOT REPORTED <0.03 ng/mL    Troponin Interp NOT REPORTED     Myoglobin 247 (H) 28 - 72 ng/mL   Lactate, Sepsis    Collection Time: 12/03/21  9:20 PM   Result Value Ref Range    Lactic Acid, Sepsis 0.9 0.5 - 1.9 mmol/L    Lactic Acid, Sepsis, Whole Blood NOT REPORTED 0.5 - 1.9 mmol/L   Ferritin    Collection Time: 12/03/21  9:20 PM   Result Value Ref Range    Ferritin 102 30 - 400 ug/L   Lactate Dehydrogenase    Collection Time: 12/03/21  9:20 PM   Result Value Ref Range     135 - 225 U/L       Imaging/Diagnostics:  XR CHEST PORTABLE    Result Date: 12/3/2021  Mild left basilar airspace opacities which could represent atelectasis and/or infiltrate. Assessment :      Hospital Problems           Last Modified POA    * (Principal) Pneumonia of left lower lobe due to infectious organism 12/4/2021 Yes    COVID-19 12/4/2021 Yes    Type 2 diabetes mellitus, without long-term current use of insulin ([address] 12/3/2021 Yes    Acquired hypothyroidism 12/3/2021 Yes    Anal cancer ([facility] 12/3/2021 Yes    Acute kidney injury superimposed on CKD ([address] 12/3/2021 Yes    CKD (chronic kidney disease) stage 3, GFR 30-59 ml/min ([address] 12/3/2021 Yes        Plan:     Patient status inpatient in the  Progressive Unit/Step down    1. Pneumonia- IV azithromycin and rocephin. Check procalcitonin. 2. COVID-19- CRP elevated, start decadron. Supplemental oxygen as needed, encourage self prone maneuvers. 3.  MICHAEL on CKD- monitor renal function, avoid nephrotoxic agents. 4. DM- monitor and control blood glucose, insulin sliding scale every 4 hours. 5. Hypothyroidism- subclinical hypothyroidism, continue levothyroxine.    6. Anal cancer/renal mass- [facility] records reviewed. Patient does not recall having either of these conditions. 7. Will obtain Xray left shoulder. 8. DVT prophylaxis with subq heparin. 9. Monitor vital signs. 10. Follow chemistries. 11. Diabetic diet. 12. Activity as tolerated with assist.     Plan of care discussed with patient. Consultations:   IP CONSULT TO INTERNAL MEDICINE    Patient is admitted as inpatient status because of co-morbidities listed above, severity of signs and symptoms as outlined, requirement for current medical therapies and most importantly because of direct risk to patient if care not provided in a hospital setting. Expected length of stay > 48 hours.     Vicente Han, APRN - CNP  12/3/2021  10:52 PM    Copy sent to Dr. Bijan Christina

## 2021-12-04 NOTE — PLAN OF CARE
Problem: Falls - Risk of:  Goal: Will remain free from falls  Description: Will remain free from falls  Outcome: Ongoing   No falls noted this shift. Problem: Falls - Risk of:  Goal: Absence of physical injury  Description: Absence of physical injury  Outcome: Ongoing   No injury noted this shift. Problem: Skin Integrity:  Goal: Will show no infection signs and symptoms  Description: Will show no infection signs and symptoms  Outcome: Ongoing   No new altered skin noted this shift. Problem: Skin Integrity:  Goal: Absence of new skin breakdown  Description: Absence of new skin breakdown  Outcome: Ongoing   No new skin breakdown noted this shift. Problem: Airway Clearance - Ineffective  Goal: Achieve or maintain patent airway  Outcome: Ongoing   Pt continues on RA. Problem: Gas Exchange - Impaired  Goal: Absence of hypoxia  Outcome: Ongoing   Denies SOB. Pt continues to have dry cough. Problem: Isolation Precautions - Risk of Spread of Infection  Goal: Prevent transmission of infection  Outcome: Ongoing   Monitoring. Continuing covid precautions. Problem: Nutrition Deficits  Goal: Optimize nutritional status  Outcome: Ongoing   Poor oral intake. Will continue to monitor. Problem: Risk for Fluid Volume Deficit  Goal: Maintain normal heart rhythm  Outcome: Ongoing   Monitoring. Problem: Loneliness or Risk for Loneliness  Goal: Demonstrate positive use of time alone when socialization is not possible  Outcome: Ongoing   Monitoring.

## 2021-12-04 NOTE — ED PROVIDER NOTES
EMERGENCY DEPARTMENT ENCOUNTER   ATTENDING ATTESTATION     Pt Name: Nate Gregg  MRN: [id number]  [place] 1935  Date of evaluation: 12/3/21   Nate Gregg is a 81 y.o. male with CC: Fever (per EMS) and Fatigue (last 2-3 days. )    MDM:   The patient is a 60-year-old male who presented to the emergency department by EMS from home secondary to fever and fatigue. Patient placed in droplet precautions rapid Covid obtained and positive. Chest x-ray concerning for pneumonia. Patient started updated on plan of care, exception was made for patient's daughter to see him as he is hard of hearing and she is POA. Requested initially for patient to be transferred to [facility] however they are currently on bypass and daughter agreed for patient to be admitted here at [facility]. Patient discussed with the internal medicine service who agreed to admit for further evaluation treatment. CRITICAL CARE:       EKG: All EKG's are interpreted by the Emergency Department Physician who either signs or Co-signs this chart in the absence of a cardiologist.      RADIOLOGY:All plain film, CT, MRI, and formal ultrasound images (except ED bedside ultrasound) are read by the radiologist, see reports below, unless otherwise noted in MDM or here. XR CHEST PORTABLE   Preliminary Result   Mild left basilar airspace opacities which could represent atelectasis and/or   infiltrate. LABS: All lab results were reviewed by myself, and all abnormals are listed below.   Labs Reviewed   COVID-19, RAPID - Abnormal; Notable for the following components:       Result Value    SARS-CoV-2, Rapid DETECTED (*)     All other components within normal limits   URINE RT REFLEX TO CULTURE - Abnormal; Notable for the following components:    Glucose, Ur 3+ (*)     Ketones, Urine TRACE (*)     Urine Hgb 2+ (*)     Protein, UA 2+ (*)     All other components within normal limits   CBC WITH AUTO DIFFERENTIAL - Abnormal; Notable for CURRENT MEDICATIONS       Previous Medications    GABAPENTIN (NEURONTIN) 300 MG CAPSULE    1 capsule    GLIMEPIRIDE (AMARYL) 4 MG TABLET        LEVOTHYROXINE (SYNTHROID) 88 MCG TABLET        PANTOPRAZOLE (PROTONIX) 40 MG TABLET    Take 40 mg by mouth 2 times daily (before meals)    TOLTERODINE (DETROL LA) 4 MG EXTENDED RELEASE CAPSULE    TAKE ONE CAPSULE BY MOUTH DAILY     ALLERGIES     has No Known Allergies. FAMILY HISTORY     has no family status information on file. SOCIAL HISTORY       Social History     Tobacco Use    Smoking status: Former Smoker    Smokeless tobacco: Never Used   Substance Use Topics    Alcohol use: Not Currently    Drug use: Never       I personally evaluated and examined the patient in conjunction with the APC and agree with the assessment, treatment plan, and disposition of the patient as recorded by the APC. Luciano Cheung MD  The care is provided during an unprecedented national emergency due to the novel coronavirus, COVID 19.   Attending Emergency Physician          Luciano Cheung MD  22/07/18 [id number]

## 2021-12-04 NOTE — PROGRESS NOTES
dyspnea on exertion, shortness of breath, wheezing  Cardiovascular:  negative for chest pain, chest pressure/discomfort, lower extremity edema, palpitations  Gastrointestinal:  negative for abdominal pain, constipation, diarrhea, nausea, vomiting  Neurological: Positive for weakness, negative for dizziness, headache    Medications: Allergies:  No Known Allergies    Current Meds:   Scheduled Meds:    levothyroxine  88 mcg Oral Daily    pantoprazole  40 mg Oral BID AC    trospium  20 mg Oral Nightly    azithromycin  500 mg IntraVENous Q24H    cefTRIAXone (ROCEPHIN) IV  1,000 mg IntraVENous Q24H    Vitamin D  2,000 Units Oral Daily    insulin lispro  0-12 Units SubCUTAneous Q4H    heparin (porcine)  5,000 Units SubCUTAneous 3 times per day     Continuous Infusions:   PRN Meds: acetaminophen, labetalol    Data:     Past Medical History:   has a past medical history of Anal cancer ([facility] CKD (chronic kidney disease), COPD (chronic obstructive pulmonary disease) ([facility] [address] [facility] Diabetes mellitus ([facility] Hypothyroid, and Renal mass. Social History:   reports that he has quit smoking. He has never used smokeless tobacco. He reports previous alcohol use. He reports that he does not use drugs. Family History:   Family History   Problem Relation Age of Onset    No Known Problems Mother     No Known Problems Father        Vitals:  BP (!) 179/77   Pulse 63   Temp 97.7 °F (36.5 °C) (Oral)   Resp 18   Ht 5' 10\" (1.778 m)   Wt 138 lb (62.6 kg)   SpO2 96%   BMI 19.80 kg/m²   Temp (24hrs), Av.9 °F (36.6 °C), Min:97.7 °F (36.5 °C), Max:98.1 °F (36.7 °C)    Recent Labs     21  0107 21  0543 21  1043 21  1332   POCGLU 281* 230* 295* 211*       I/O (24Hr):     Intake/Output Summary (Last 24 hours) at 2021 1459  Last data filed at 2021 1423  Gross per 24 hour   Intake 400 ml   Output 550 ml   Net -150 ml       Labs:  Hematology:  Recent Labs     21  1830 21  2120 12/04/21  1433   WBC 8.2  --  8.8   RBC 4.55  --  4.25   HGB 12.9*  --  12.1*   HCT 41.0  --  38.3*   MCV 90.1  --  90.1   MCH 28.4  --  28.5   MCHC 31.5  --  31.6   RDW 14.8*  --  14.7*     --  234   MPV 9.8  --  9.7   CRP  --  112.0*  --      Chemistry:  Recent Labs     12/03/21  1830 12/03/21 2120 12/04/21  1433   *  --  137   K 4.3  --  4.0   CL 96*  --  100   CO2 25  --  24   GLUCOSE 314*  --  168*   BUN 43*  --  42*   CREATININE 2.41*  --  2.46*   MG 2.4  --   --    ANIONGAP 12  --  13   LABGLOM 26*  --  25*   GFRAA 31*  --  30*   CALCIUM 9.6  --  9.0   PROBNP 1,295*  --   --    TROPHS 33* 34*  --    MYOGLOBIN 224* 247*  --      Recent Labs     12/03/21  1830 12/03/21 2120 12/04/21  0107 12/04/21  0543 12/04/21  1043 12/04/21  1332   LABA1C 7.9*  --   --   --   --   --    TSH 5.22*  --   --   --   --   --    LDH  --  224  --   --   --   --    POCGLU  --   --  281* 230* 295* 211*     ABG:No results found for: POCPH, PHART, PH, POCPCO2, KBN7AND, PCO2, POCPO2, PO2ART, PO2, POCHCO3, YRZ5QST, HCO3, NBEA, PBEA, BEART, BE, THGBART, THB, AXW1VOE, LDSM5LSH, Z0BWBRFQ, O2SAT, FIO2  Lab Results   Component Value Date/Time    SPECIAL LT FA, 2 ML 12/03/2021 09:30 PM     Lab Results   Component Value Date/Time    CULTURE NO GROWTH 12 HOURS 12/03/2021 09:30 PM       Radiology:  XR SCAPULA LEFT (COMPLETE)    Result Date: 12/4/2021  No acute osseous or soft tissue abnormality. CT CHEST WO CONTRAST    Result Date: 12/4/2021  Few subtle nodular infiltrates peripherally in the right lower lobe and right middle lobe of uncertain etiology. This may relate to an infectious or inflammatory process although follow-up chest CT is recommended in 3 months to assess resolution. XR SHOULDER LEFT (MIN 2 VIEWS)    Result Date: 12/4/2021  Degenerative change without acute osseous or soft tissue abnormality.      XR CHEST PORTABLE    Result Date: 12/3/2021  Mild left basilar airspace opacities which could represent atelectasis and/or infiltrate. Physical Examination:        General appearance:  alert, cooperative, hard of hearing and in no acute distress  Mental Status:  oriented to person, place and not time and normal affect  Lungs: Decreased breath as well, prolonged expiratory phase  Heart:  regular rate and rhythm, systolic murmur  Abdomen:  soft, nontender, nondistended, normal bowel sounds  Extremities:  no edema, redness, tenderness in the calves  Skin:  no gross lesions, rashes, induration    Assessment:        Hospital Problems           Last Modified POA    * (Principal) Pneumonia of left lower lobe due to infectious organism 12/4/2021 Yes    COVID-19 12/4/2021 Yes    Type 2 diabetes mellitus, without long-term current use of insulin ([address] 12/3/2021 Yes    Acquired hypothyroidism 12/3/2021 Yes    Anal cancer ([address] 12/3/2021 Yes    Acute kidney injury superimposed on CKD ([address] 12/3/2021 Yes    CKD (chronic kidney disease) stage 3, GFR 30-59 ml/min ([facility]) 12/3/2021 Yes    Renal insufficiency syndrome 12/4/2021 Yes    Gastroesophageal reflux disease with esophagitis 12/4/2021 Yes    COPD, mild ([address] 12/4/2021 Yes    Overview Signed 12/4/2021  2:58 PM by Radha Persaud MD     Formatting of this note might be different from the original.  PFT on 05/10/2018 Conclusions:  mild obstructive ventilatory defect, predominantly small airway, is present consistent with COPD. Plan:        1. Right middle and right lower lobe pneumonia. Continue azithromycin. 2. COVID-19 positive. Did discuss with patient and daughter who is POA, did not have the paperwork. But patient does acknowledge. Will give Regeneron monoclonal antibodies  3. Follow-up CRP, follow-up BMP  4. History of CKD with baseline creatinine between 2 and 2.3  5. Moderate protein malnutrition. Consult to dietary, nutritional supplements  6. Diabetes type 2 hemoglobin A1c 7.9. Insulin sliding scale  7. GERD.   Protonix twice

## 2021-12-05 ENCOUNTER — APPOINTMENT (OUTPATIENT)
Dept: GENERAL RADIOLOGY | Age: 86
DRG: 177 | End: 2021-12-05
Payer: COMMERCIAL

## 2021-12-05 ENCOUNTER — APPOINTMENT (OUTPATIENT)
Dept: CT IMAGING | Age: 86
DRG: 177 | End: 2021-12-05
Payer: COMMERCIAL

## 2021-12-05 LAB
ABSOLUTE EOS #: 0 K/UL (ref 0–0.4)
ABSOLUTE IMMATURE GRANULOCYTE: 0.18 K/UL (ref 0–0.3)
ABSOLUTE LYMPH #: 0.55 K/UL (ref 1–4.8)
ABSOLUTE MONO #: 0.73 K/UL (ref 0.2–0.8)
ANION GAP SERPL CALCULATED.3IONS-SCNC: 15 MMOL/L (ref 9–17)
BASOPHILS # BLD: 0 %
BASOPHILS ABSOLUTE: 0 K/UL (ref 0–0.2)
BUN BLDV-MCNC: 43 MG/DL (ref 8–23)
BUN/CREAT BLD: 16 (ref 9–20)
CALCIUM SERPL-MCNC: 8.6 MG/DL (ref 8.6–10.4)
CHLORIDE BLD-SCNC: 100 MMOL/L (ref 98–107)
CO2: 21 MMOL/L (ref 20–31)
CREAT SERPL-MCNC: 2.63 MG/DL (ref 0.7–1.2)
DIFFERENTIAL TYPE: ABNORMAL
EOSINOPHILS RELATIVE PERCENT: 0 % (ref 1–4)
GFR AFRICAN AMERICAN: 28 ML/MIN
GFR NON-AFRICAN AMERICAN: 23 ML/MIN
GFR SERPL CREATININE-BSD FRML MDRD: ABNORMAL ML/MIN/{1.73_M2}
GFR SERPL CREATININE-BSD FRML MDRD: ABNORMAL ML/MIN/{1.73_M2}
GLUCOSE BLD-MCNC: 116 MG/DL (ref 75–110)
GLUCOSE BLD-MCNC: 139 MG/DL (ref 75–110)
GLUCOSE BLD-MCNC: 141 MG/DL (ref 70–99)
GLUCOSE BLD-MCNC: 201 MG/DL (ref 75–110)
GLUCOSE BLD-MCNC: 333 MG/DL (ref 75–110)
GLUCOSE BLD-MCNC: 333 MG/DL (ref 75–110)
HCT VFR BLD CALC: 33.8 % (ref 40.7–50.3)
HCT VFR BLD CALC: 35.4 % (ref 40.7–50.3)
HEMOGLOBIN: 10.9 G/DL (ref 13–17)
HEMOGLOBIN: 11.5 G/DL (ref 13–17)
IMMATURE GRANULOCYTES: 1 %
LACTIC ACID, SEPSIS WHOLE BLOOD: NORMAL MMOL/L (ref 0.5–1.9)
LACTIC ACID, SEPSIS: 1.3 MMOL/L (ref 0.5–1.9)
LYMPHOCYTES # BLD: 3 % (ref 24–44)
MCH RBC QN AUTO: 28 PG (ref 25.2–33.5)
MCH RBC QN AUTO: 28.8 PG (ref 25.2–33.5)
MCHC RBC AUTO-ENTMCNC: 32.2 G/DL (ref 28.4–34.8)
MCHC RBC AUTO-ENTMCNC: 32.5 G/DL (ref 28.4–34.8)
MCV RBC AUTO: 86.9 FL (ref 82.6–102.9)
MCV RBC AUTO: 88.5 FL (ref 82.6–102.9)
MONOCYTES # BLD: 4 % (ref 1–7)
NRBC AUTOMATED: 0 PER 100 WBC
NRBC AUTOMATED: 0 PER 100 WBC
PDW BLD-RTO: 14.9 % (ref 11.8–14.4)
PDW BLD-RTO: 15 % (ref 11.8–14.4)
PLATELET # BLD: 244 K/UL (ref 138–453)
PLATELET # BLD: 249 K/UL (ref 138–453)
PLATELET ESTIMATE: ABNORMAL
PMV BLD AUTO: 10 FL (ref 8.1–13.5)
PMV BLD AUTO: 10.3 FL (ref 8.1–13.5)
POTASSIUM SERPL-SCNC: 3.9 MMOL/L (ref 3.7–5.3)
RBC # BLD: 3.89 M/UL (ref 4.21–5.77)
RBC # BLD: 4 M/UL (ref 4.21–5.77)
RBC # BLD: ABNORMAL 10*6/UL
SEG NEUTROPHILS: 92 % (ref 36–66)
SEGMENTED NEUTROPHILS ABSOLUTE COUNT: 16.84 K/UL (ref 1.8–7.7)
SODIUM BLD-SCNC: 136 MMOL/L (ref 135–144)
WBC # BLD: 14.1 K/UL (ref 3.5–11.3)
WBC # BLD: 18.3 K/UL (ref 3.5–11.3)
WBC # BLD: ABNORMAL 10*3/UL

## 2021-12-05 PROCEDURE — 2060000000 HC ICU INTERMEDIATE R&B

## 2021-12-05 PROCEDURE — 99231 SBSQ HOSP IP/OBS SF/LOW 25: CPT | Performed by: PSYCHIATRY & NEUROLOGY

## 2021-12-05 PROCEDURE — 97116 GAIT TRAINING THERAPY: CPT

## 2021-12-05 PROCEDURE — 99497 ADVNCD CARE PLAN 30 MIN: CPT | Performed by: STUDENT IN AN ORGANIZED HEALTH CARE EDUCATION/TRAINING PROGRAM

## 2021-12-05 PROCEDURE — 97535 SELF CARE MNGMENT TRAINING: CPT

## 2021-12-05 PROCEDURE — 36415 COLL VENOUS BLD VENIPUNCTURE: CPT

## 2021-12-05 PROCEDURE — 6360000002 HC RX W HCPCS: Performed by: NURSE PRACTITIONER

## 2021-12-05 PROCEDURE — 6370000000 HC RX 637 (ALT 250 FOR IP): Performed by: STUDENT IN AN ORGANIZED HEALTH CARE EDUCATION/TRAINING PROGRAM

## 2021-12-05 PROCEDURE — 70450 CT HEAD/BRAIN W/O DYE: CPT

## 2021-12-05 PROCEDURE — 6370000000 HC RX 637 (ALT 250 FOR IP): Performed by: INTERNAL MEDICINE

## 2021-12-05 PROCEDURE — 73080 X-RAY EXAM OF ELBOW: CPT

## 2021-12-05 PROCEDURE — 97163 PT EVAL HIGH COMPLEX 45 MIN: CPT

## 2021-12-05 PROCEDURE — 6370000000 HC RX 637 (ALT 250 FOR IP): Performed by: NURSE PRACTITIONER

## 2021-12-05 PROCEDURE — 6360000002 HC RX W HCPCS: Performed by: STUDENT IN AN ORGANIZED HEALTH CARE EDUCATION/TRAINING PROGRAM

## 2021-12-05 PROCEDURE — 85027 COMPLETE CBC AUTOMATED: CPT

## 2021-12-05 PROCEDURE — 83605 ASSAY OF LACTIC ACID: CPT

## 2021-12-05 PROCEDURE — 80048 BASIC METABOLIC PNL TOTAL CA: CPT

## 2021-12-05 PROCEDURE — 2580000003 HC RX 258: Performed by: STUDENT IN AN ORGANIZED HEALTH CARE EDUCATION/TRAINING PROGRAM

## 2021-12-05 PROCEDURE — 97167 OT EVAL HIGH COMPLEX 60 MIN: CPT

## 2021-12-05 PROCEDURE — 99232 SBSQ HOSP IP/OBS MODERATE 35: CPT | Performed by: STUDENT IN AN ORGANIZED HEALTH CARE EDUCATION/TRAINING PROGRAM

## 2021-12-05 PROCEDURE — 82947 ASSAY GLUCOSE BLOOD QUANT: CPT

## 2021-12-05 PROCEDURE — 97530 THERAPEUTIC ACTIVITIES: CPT

## 2021-12-05 PROCEDURE — 85025 COMPLETE CBC W/AUTO DIFF WBC: CPT

## 2021-12-05 RX ORDER — AZITHROMYCIN 250 MG/1
250 TABLET, FILM COATED ORAL DAILY
Status: COMPLETED | OUTPATIENT
Start: 2021-12-05 | End: 2021-12-08

## 2021-12-05 RX ORDER — PREDNISONE 20 MG/1
40 TABLET ORAL DAILY
Status: DISCONTINUED | OUTPATIENT
Start: 2021-12-05 | End: 2021-12-07

## 2021-12-05 RX ADMIN — HEPARIN SODIUM 5000 UNITS: 5000 INJECTION INTRAVENOUS; SUBCUTANEOUS at 21:36

## 2021-12-05 RX ADMIN — LEVOTHYROXINE SODIUM 88 MCG: 0.09 TABLET ORAL at 06:09

## 2021-12-05 RX ADMIN — Medication 2000 UNITS: at 10:03

## 2021-12-05 RX ADMIN — AZITHROMYCIN MONOHYDRATE 250 MG: 250 TABLET ORAL at 10:03

## 2021-12-05 RX ADMIN — INSULIN LISPRO 4 UNITS: 100 INJECTION, SOLUTION INTRAVENOUS; SUBCUTANEOUS at 06:39

## 2021-12-05 RX ADMIN — TROSPIUM CHLORIDE 20 MG: 20 TABLET, FILM COATED ORAL at 21:36

## 2021-12-05 RX ADMIN — METOPROLOL TARTRATE 25 MG: 25 TABLET, FILM COATED ORAL at 10:04

## 2021-12-05 RX ADMIN — INSULIN LISPRO 8 UNITS: 100 INJECTION, SOLUTION INTRAVENOUS; SUBCUTANEOUS at 21:36

## 2021-12-05 RX ADMIN — INSULIN LISPRO 8 UNITS: 100 INJECTION, SOLUTION INTRAVENOUS; SUBCUTANEOUS at 16:57

## 2021-12-05 RX ADMIN — METOPROLOL TARTRATE 25 MG: 25 TABLET, FILM COATED ORAL at 21:36

## 2021-12-05 RX ADMIN — PREDNISONE 40 MG: 20 TABLET ORAL at 10:04

## 2021-12-05 RX ADMIN — HEPARIN SODIUM 5000 UNITS: 5000 INJECTION INTRAVENOUS; SUBCUTANEOUS at 06:10

## 2021-12-05 RX ADMIN — HYDRALAZINE HYDROCHLORIDE 5 MG: 20 INJECTION INTRAMUSCULAR; INTRAVENOUS at 01:58

## 2021-12-05 RX ADMIN — SODIUM CHLORIDE, PRESERVATIVE FREE 10 ML: 5 INJECTION INTRAVENOUS at 10:05

## 2021-12-05 RX ADMIN — SODIUM CHLORIDE, PRESERVATIVE FREE 10 ML: 5 INJECTION INTRAVENOUS at 21:45

## 2021-12-05 RX ADMIN — PANTOPRAZOLE SODIUM 40 MG: 40 TABLET, DELAYED RELEASE ORAL at 06:09

## 2021-12-05 RX ADMIN — PANTOPRAZOLE SODIUM 40 MG: 40 TABLET, DELAYED RELEASE ORAL at 16:57

## 2021-12-05 ASSESSMENT — PAIN SCALES - GENERAL
PAINLEVEL_OUTOF10: 0

## 2021-12-05 NOTE — CONSULTS
[facility] Neurology   IN-PATIENT SERVICE      NEUROLOGY CONSULT  NOTE            Date:   12/5/2021  Patient name:  Victor Hugo Alejo  Date of admission:  12/3/2021  YOB: 1935      Chief Complaint:     Chief Complaint   Patient presents with    Fever     per EMS    Fatigue     last 2-3 days. Reason for Consult:      Evaluation of dementia    History of Present Illness: The patient is a 81 y.o. male who presents with Fever (per EMS) and Fatigue (last 2-3 days. )  . The patient was seen and examined and the chart was reviewed. Patient recently diagnosed is Covid positive. He is in isolation. Patient was resting comfortably in bed. He has monitors to keep him from getting out of bed without supervision. He has a tendency to want to get up out of bed. Currently has his supper provided to him. Patient does admit to having a memory defect but has some problems with comprehension during the interview. Past Medical History:     Past Medical History:   Diagnosis Date    Anal cancer ([facility] [address]     CKD (chronic kidney disease)     COPD (chronic obstructive pulmonary disease) ([facility]     Diabetes mellitus ([facility]     Hypothyroid     Renal mass         Past Surgical History:     Past Surgical History:   Procedure Laterality Date    ANUS SURGERY      CATARACT REMOVAL      COLONOSCOPY      FINGER TRIGGER RELEASE      KNEE ARTHROSCOPY      ROTATOR CUFF REPAIR      UPPER GASTROINTESTINAL ENDOSCOPY      VASECTOMY          Medications Prior to Admission:     Prior to Admission medications    Medication Sig Start Date End Date Taking?  Authorizing Provider   levothyroxine (SYNTHROID) 88 MCG tablet Take 88 mcg by mouth Daily  10/6/21  Yes Historical Provider, MD   glimepiride (AMARYL) 4 MG tablet Take 4 mg by mouth 2 times daily (before meals)  11/12/21  Yes Historical Provider, MD   pantoprazole (PROTONIX) 40 MG tablet Take 40 mg by mouth 2 times daily (before meals) 8/30/21  Yes Historical Provider, MD   tolterodine (DETROL LA) 4 MG extended release capsule Take 4 mg by mouth daily  7/15/21  Yes Historical Provider, MD        Allergies:     Patient has no known allergies. Social History:     Tobacco:    reports that he has quit smoking. He has never used smokeless tobacco.  Alcohol:      reports previous alcohol use. Drug Use:  reports no history of drug use. Family History:     Family History   Problem Relation Age of Onset    No Known Problems Mother     No Known Problems Father            Review of Systems:     Limited ability to provide information. He states that he is doing relatively well. Physical Exam:   /69   Pulse 85   Temp 97.9 °F (36.6 °C) (Oral)   Resp 18   Ht 5' 10\" (1.778 m)   Wt 125 lb 8 oz (56.9 kg)   SpO2 95%   BMI 18.01 kg/m²   Temp (24hrs), Av.1 °F (36.7 °C), Min:97.8 °F (36.6 °C), Max:98.6 °F (37 °C)    General examination:      General Appearance:  alert, well appearing, and in no acute distress  HEENT: Normocephalic, atraumatic  Extremities:  no cyanosis, clubbing or edema  Psych: normal affect    Neurological examination:    Mental status   Alert and oriented x person and place has difficulty with time.;  He is following all commands; speech is fluent, no dysarthria, aphasia. Memory testing showed recall 2 out of 3 objects. Is difficult to say how much she actually comprehends versus how much she misses because of impaired hearing     Cranial nerves   II - visual fields intact to confrontation; pupils reactive. III, IV, VI  extraocular muscles intact; no NICOLE; no nystagmus; no ptosis   V - normal facial sensation                                                               VII - normal facial symmetry                                                             VIII -hearing appears to be impaired                                                      IX, X -phonation appears to be intact.   Patient was able to swallow his dinner XI -he can turn his head from side to side                                            XII - midline tongue      Motor function  Strength: He is thinly built but appears to have adequate strength in all 4 extremities. Sensory function Intact to touch, vibration throughout     Cerebellar  no tremors cogwheeling or rigidity no ataxia   Reflex function trace/4 symmetric throughout . Downgoing plantar response bilaterally. Gait                   for it at this time       Diagnostics:      Laboratory Testing:  CBC:   Recent Labs     12/03/21  1830 12/04/21  1433 12/05/21  0851   WBC 8.2 8.8 18.3*   HGB 12.9* 12.1* 11.5*    234 249     BMP:    Recent Labs     12/03/21  1830 12/04/21  1433 12/05/21  0851   * 137 136   K 4.3 4.0 3.9   CL 96* 100 100   CO2 25 24 21   BUN 43* 42* 43*   CREATININE 2.41* 2.46* 2.63*   GLUCOSE 314* 168* 141*         Lab Results   Component Value Date    TSH 5.22 (H) 12/03/2021    LABA1C 7.9 (H) 12/03/2021     B12 on chart is over 800    CT of head 2/5/2021    Impression   Atrophy and chronic microvascular disease without acute intracranial   abnormality. Impression:      1. Patient does appear to have dementia but impaired hearing may limit assessment at this time. 2. CT of the brain does demonstrate atrophy and chronic microvascular disease. Plan:      Likely chronic memory loss associated with atrophy and microvascular disease.  Currently being treated for Covid.  Recommend follow-up evaluation as outpatient neurology once he is recovered from Covid.  Patient may also benefit from a hearing test.       Thank you for this very interesting consultation.       Electronically signed by Amadeo Schmidt MD on 12/5/2021 at 6:09 PM    Amadeo Schmidt MD  [facility]  Neurology

## 2021-12-05 NOTE — CARE COORDINATION
Case Management Initial Discharge Plan  Russlel Jorge A,         Readmission Risk              Risk of Unplanned Readmission:  18             Met with:family member patient and daughter Tee Alvarez)  to discuss discharge plans. Information verified: address, contacts, phone number, , insurance -- needed correction  PCP: Nunu Bro  Date of last visit:     Insurance Provider: [employer]     Discharge Planning  Current Residence:    Living Arrangements:  Spouse/Significant Other       Home is an apt in basement that requires 4-5 steps to go down to enter into   Support Systems:  Children, Family Members       Current Services PTA:  None  Agency: none      Patient able to perform ADL's:Independent  DME in home:  Cane   DME used to aid ambulation prior to admission:   None   DME used during admission:  tbd     Potential Assistance Needed:  Meals On Wheels, Outpatient PT/OT, Long Term Acute Care    [facility]: [facility] on [address] by [facility]    Potential Assistance Purchasing Medications:    na   Does patient want to participate in local refill/ meds to beds program?   na     Patient agreeable to home care: will follow   Freedom of choice provided:  n/a      Type of Home Care Services:  Meals on Wheels  Patient expects to be discharged to:   snf vs home care     Prior SNF/Rehab Placement and Facility: none   Agreeable to SNF/Rehab: will discuss   [place] of choice provided: n/a   Evaluation: n/a    Expected Discharge date:      Follow Up Appointment: Best Day/ Time:  any     Transportation provider: depends on final plan   Transportation arrangements needed for discharge: No    Discharge Plan:   Patient admitted with COVID and currently on 2 liters. Patient has been slightly confused. Call to daughter Travis Burnett at [phone] to obtain information. Patient lives with wife Dannie Martinez ( daughter is poa) and home number is [phone].      Travis Burnett states dad has been unsteady and refusing to use a cane. He has never had any home care nor snf in past. He has [employer] ( not showing on face sheet and notified HELP via VM). Patient has had regeneron on 12/4. Therapy did see and stated he was not safe to go home. Daughter states that she has POA and wants him to go to snf. Explained unless she has actual legal guardianship cannot force dad to go.  Did offer to go in with dad and put on his speaker phone to daughter to discuss a plan of care ( home care vs snf )         Electronically signed by Ameya Corado RN on 12/5/21 at 11:25 AM EST

## 2021-12-05 NOTE — PROGRESS NOTES
Occupational Therapy   Occupational Therapy Initial Assessment  Date: 2021   Patient Name: Garcia Nichols  MRN: [id number]     : 1935    Date of Service: 2021    Discharge Recommendations:  Patient would benefit from continued therapy after discharge Pt currently functioning below baseline. Would suggest additional therapy at time of discharge to maximize long term outcomes and prevent re-admission. Please refer to AM-PAC score for current level of function. Garcia Nichols is a 81 y.o. male who presents to the emergency department by EMS for evaluation of fatigue and generalized weakness for the past 2-3 days. EMS also reported patient had a fever. He is afebrile upon arrival.  Temp 98.1. Patient is extremely hard of hearing. 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 No acute distress upon arrival.  Denies chest pain, cough, shortness of breath, imani pain, nausea or vomiting. History of COPD, diabetes, CKD, anal cancer, renal mass, and hypothyroidism.       RN reports patient is medically stable for therapy treatment this date. Chart reviewed prior to treatment and patient is agreeable for therapy. All lines intact and patient positioned comfortably at end of treatment. All patient needs addressed prior to ending therapy session. Assessment   Performance deficits / Impairments: Decreased functional mobility ; Decreased ADL status; Decreased strength; Decreased safe awareness; Decreased cognition; Decreased endurance; Decreased balance; Decreased posture  Assessment: Skilled OT is indicated to increase overall safety awareness in function as well as strenght, balance, ADL status, functional mobility, and cognition to improve functional outcomes, I, and return to home. Prognosis: Good  Decision Making: High Complexity  OT Education: OT Role; Plan of Care; Home Exercise Program; Precautions;  ADL Adaptive Strategies; Transfer Training; Energy Conservation; Equipment; Family Education  Patient Education: use of call light, fall prevnetion  Barriers to Learning: hearing and cognition this session  REQUIRES OT FOLLOW UP: Yes  Activity Tolerance  Activity Tolerance: Treatment limited secondary to decreased cognition; Patient Tolerated treatment well; Patient limited by fatigue  Activity Tolerance: pt on room air during session (nasal canula in nose, but not hooked up to O2) and sats maintained 91-95% throughout session)  Safety Devices  Safety Devices in place: Yes  Type of devices: Call light within reach; Nurse notified; Gait belt; Patient at risk for falls; Left in chair; Chair alarm in place           Patient Diagnosis(es): The primary encounter diagnosis was COVID. A diagnosis of Pneumonia of left lower lobe due to infectious organism was also pertinent to this visit. has a past medical history of Anal cancer ([facility] CKD (chronic kidney disease), COPD (chronic obstructive pulmonary disease) ([facility] Diabetes mellitus ([facility] Hypothyroid, and Renal mass. has a past surgical history that includes Anus surgery; Cataract removal; Colonoscopy; Upper gastrointestinal endoscopy; Rotator cuff repair; Knee arthroscopy; Vasectomy; and Finger trigger release.            Restrictions  Restrictions/Precautions  Restrictions/Precautions: General Precautions, Fall Risk, Contact Precautions, Isolation, Up as Tolerated  Required Braces or Orthoses?: No  Position Activity Restriction  Other position/activity restrictions: Up w/ assist    Subjective   General  Chart Reviewed: Yes  Patient assessed for rehabilitation services?: Yes  Family / Caregiver Present: No  Patient Currently in Pain: Denies  Pain Assessment  Pain Assessment: 0-10  Pain Level: 0  Vital Signs  Temp: 97.9 °F (36.6 °C)  Temp Source: Oral  Pulse: 80  Heart Rate Source: Monitor  Resp: 17  BP: (!) 154/68  BP Location: Right upper arm  MAP (mmHg): 92  Patient Position: Semi fowlers  Level of Consciousness: Alert (0)  MEWS Score: 1  Patient Currently in Pain: Denies  Oxygen Therapy  SpO2: 95 %  O2 Device: None (Room air)  Social/Functional History  Social/Functional History  Lives With: Spouse  Type of Home: Apartment  Home Layout: One level  Home Access: Stairs to enter with rails  Entrance Stairs - Number of Steps: 4  Entrance Stairs - Rails: Both  Bathroom Shower/Tub: Tub/Shower unit  Bathroom Toilet: Standard  Bathroom Equipment: Shower chair, Grab bars in shower  Home Equipment: Rolling walker, Deanne Lopes (Pt initially reporting no use of DME at baseline, but then reported using a cane at all times.)  ADL Assistance: [address]: Independent  Homemaking Responsibilities: Yes (shares w/ wife)  Ambulation Assistance: Independent  Transfer Assistance: Independent  Active : Yes  Leisure & Hobbies: cooking,  Additional Comments: Pt reports no falls, but does report \"1000\" stumbles. **Pt appears to be questionable historian. Unsure of accuracy of social/functional history; pt providing conflicting answers throughout session. **       Objective   Vision: Impaired  Vision Exceptions: Wears glasses at all times  Hearing: Exceptions to [facility] (Pt is VERY [place]!)  Hearing Exceptions: Hard of hearing/hearing concerns    Orientation  Overall Orientation Status: Impaired (unable to fully assess d/t hearing deficits, difficulty answering questions/following directions. Cognitive deficits noted however as well as hearing being a limitation)  Observation/Palpation  Posture: Fair  Observation: posterior lean while sitting EOB  Balance  Sitting Balance: Moderate assistance (mod A sitting EOB during LE testing by PT; pt unable to maintain wt forward. Falling backwards with dec. awareness. CGA/min with static sitting.)  Standing Balance:  Moderate assistance (x2; posterior LOB, poor safety awareness, dec insight into deficits)  Functional Mobility  Functional - Mobility Device: Rolling Walker  Assist

## 2021-12-05 NOTE — PROGRESS NOTES
Physical Therapy    Facility/Department: [facility] PROGRESSIVE CARE  Initial Assessment    NAME: Victor Hugo Alejo  : 1935  MRN: [id number]    Date of Service: 2021   EVERARDO ellison reports patient is medically stable for therapy treatment this date. Chart reviewed prior to treatment and patient is agreeable for therapy. All lines intact and patient positioned comfortably at end of treatment. All patient needs addressed prior to ending therapy session. Per H&P: Victor Hugo Alejo is a 81 y.o. Non- / non  male who presents with Fever (per EMS) and Fatigue (last 2-3 days. )and is admitted to the hospital for the management of Pneumonia of left lower lobe due to infectious organism. The patient presents to hospital with complaint of weakness and fatigue. He reports pain to his left shoulder that he describes as constant, dull, aching and moderate in intensity. He denies fall or injury. The patient is a poor historian and has difficulty elaborating on his condition. He is oriented to self and place, he is disoriented to year. He has no other specific complaint. He denies fever, chills or shortness of breath. He is currently 97% on room air. He has past medical history that includes anal cancer, CKD, renal mass, COPD, diabetes and hypothyroidism. He is unsure of his vaccination status. Discharge Recommendations:  Pt currently functioning below baseline. Would suggest additional therapy at time of discharge to maximize long term outcomes and prevent re-admission. Please refer to AM-PAC score for current level of function. Patient would benefit from continued therapy after discharge      Assessment   Body structures, Functions, Activity limitations: Decreased functional mobility ; Decreased strength; Decreased safe awareness; Decreased balance; Decreased endurance; Decreased cognition; Decreased posture  Assessment: Pt tolerated PT eval fair.   Pt w/ significant deficits in mobility, transfers, and ambulation. Pt is most limited by decreased cognition. Pt is a HIGH fall risk d/t decreased balance, decreased safety awareness, decreased strength, and decreased cognition. Pt currently requires skilled 2 person assist for safe functional mobility. Pt would benefit from continued skilled physical therapy to address these deficits in order to return to PLOF. Prognosis: Good  Decision Making: High Complexity  PT Education: Goals; PT Role; Plan of Care; General Safety; Gait Training; Functional Mobility Training; Transfer Training  Patient Education: Pt educated on: purpose of acute PT eval, importance of continued mobility throughout admission, general safety awareness, safe use of RW throughout transfers and ambulation, and PT POC. Pt w/ poor return demo. Pt requires continued reinforcement of education. REQUIRES PT FOLLOW UP: Yes  Activity Tolerance  Activity Tolerance: Patient limited by cognitive status       Patient Diagnosis(es): The primary encounter diagnosis was COVID. A diagnosis of Pneumonia of left lower lobe due to infectious organism was also pertinent to this visit. has a past medical history of Anal cancer ([facility] CKD (chronic kidney disease), COPD (chronic obstructive pulmonary disease) ([facility] Diabetes mellitus ([facility] Hypothyroid, and Renal mass. has a past surgical history that includes Anus surgery; Cataract removal; Colonoscopy; Upper gastrointestinal endoscopy; Rotator cuff repair; Knee arthroscopy; Vasectomy; and Finger trigger release.     Restrictions  Restrictions/Precautions  Restrictions/Precautions: General Precautions, Fall Risk, Contact Precautions, Isolation, Up as Tolerated  Required Braces or Orthoses?: No  Position Activity Restriction  Other position/activity restrictions: Up w/ assist  Vision/Hearing  Vision: Impaired  Vision Exceptions: Wears glasses at all times  Hearing: Exceptions to [facility] (extremely [place],  R>L)  Hearing Exceptions: Hard of hearing/hearing recall of recent events  Safety Judgement: Decreased awareness of need for assistance; Decreased awareness of need for safety  Problem Solving: Decreased awareness of errors; Assistance required to identify errors made; Assistance required to generate solutions; Assistance required to implement solutions; Assistance required to correct errors made  Insights: Not aware of deficits  Initiation: Requires cues for all  Sequencing: Requires cues for all    Objective     Observation/Palpation  Posture: Fair  Observation: posterior lean while sitting EOB    AROM RLE (degrees)  RLE AROM: WFL  AROM LLE (degrees)  LLE AROM : WFL  AROM RUE (degrees)  RUE AROM : WFL  AROM LUE (degrees)  LUE AROM : WFL  Strength RLE  Strength RLE: WFL  Comment: Grossly 4-/5  Strength LLE  Strength LLE: WFL  Comment: Grossly 4-/5  Strength RUE  Comment: See OT assessment for detail  Strength LUE  Comment: See OT assessment for detail  Tone RLE  RLE Tone: Normotonic  Tone LLE  LLE Tone: Normotonic  Motor Control  Gross Motor?: WFL  Sensation  Overall Sensation Status: WFL (denies numbness/tingling)  Bed mobility  Supine to Sit: Moderate assistance; 2 Person assistance  Sit to Supine:  (Not assessed this date, pt retired to bedside chair.)  Comment: Pt w/ difficulty throughout bed mobility this date, requiring assist for progression of BLE and trunk throughout. Pt also requiring MAX verbal and tactile cueing for initiation, progression, and sequencing throughout. Upon sitting EOB, pt requiring ModA-MaxA from staff to maintain sitting balance demonstrating significant posterior lean. Transfers  Sit to Stand: Moderate Assistance; 2 Person Assistance  Stand to sit: Moderate Assistance; 2 Person Assistance  Bed to Chair: Moderate assistance; 2 Person Assistance  Comment: Pt w/ poor steadiness throughout transfers this date, requiring MAX verbal and tactile cueing for proper hand placement throughout transfers w/ RW w/ POOR return demo.   Pt also demonstrating poor eccentric quad control throughout stand>sit transfer. Ambulation  Ambulation?: Yes  More Ambulation?: No  Ambulation 1  Surface: level tile  Device: Rolling Walker  Assistance: Moderate assistance; 2 Person assistance  Quality of Gait: poor steadiness, poor safety awareness, assist for progression of RW throughout  Gait Deviations: Slow Nany; Decreased step length; Decreased step height; Shuffles; Staggers  Distance: 10ft x 2  Comments: Pt ambulating w/ poor steadiness requiring MaxA for progression of RW throughout. Pt w/ staggering steps throughout turns and poor safety awareness throughout. Following ambulation, pt's SpO2 remained 91% and higher on room air. Stairs/Curb  Stairs?: No     Balance  Posture: Fair  Sitting - Static: Fair; +  Sitting - Dynamic: Fair; -  Standing - Static: Fair; -  Standing - Dynamic: Poor; +  Comments: Standing balance assessed w/ RW        Plan   Plan  Times per week: 1-2x/day, 5-6x/week  Current Treatment Recommendations: Strengthening, Balance Training, Functional Mobility Training, Stair training, Gait Training, Transfer Training, Endurance Training, Safety Education & Training, Home Exercise Program, Equipment Evaluation, Education, & procurement, Patient/Caregiver Education & Training  Safety Devices  Type of devices: Call light within reach, Chair alarm in place, Gait belt, Nurse notified, Left in chair, Telesitter in use  Restraints  Initially in place: No    AM-PAC Score  AM-PAC Inpatient Mobility Raw Score : 12 (12/05/21 1335)  AM-PAC Inpatient T-Scale Score : 35.33 (12/05/21 1335)  Mobility Inpatient CMS 0-100% Score: 68.66 (12/05/21 1335)  Mobility Inpatient CMS G-Code Modifier : CL (12/05/21 1335)       Functional Outcome Measure-   Single Leg Stance Test:  0 sec.  (<5 sec.= fall risk)    Goals  Short term goals  Time Frame for Short term goals: 14 visits  Short term goal 1: Pt to demonstrate bed mobility Kim  Short term goal 2: Pt to perform STS transfers w/ RW Kim  Short term goal 3: Pt to ambulate at least 150ft w/ RW w/ Kim  Short term goal 4: Pt to actively participate in at least 30 minutes of physical therapy for ther act, ther ex, balance, gait, and transfer training  Short term goal 5: Pt to ascend/descend 5 stairs w/ handrails ModA  Patient Goals   Patient goals : To go home       Therapy Time   Individual Concurrent Group Co-treatment   Time In 1102         Time Out 1141         Minutes 39           Treatment time: 25 minutes       Co-treatment with OT warranted secondary to decreased safety and independence requiring 2 skilled therapy professionals to address individual discipline's goals.      Timothy Gr, PT

## 2021-12-05 NOTE — PROGRESS NOTES
[facility]  Office: [address], DO, Morena Varma, DO, Dorian Molina, DO, Cameron Bautista, DO, Bill Cleveland MD, Vincenzo Frank MD, Akua Sandra MD, Zenobia Fontenot MD, Kya Chaudhry MD, Jyoti Archuleta MD, Shawn Kohler MD, Ayan Aponte, DO, Zurdo Schrader, DO, Sheng Flaherty MD,  Manuel Greene, DO, Carina Garcia MD, Zuly Choi MD, Salome Marti MD, Lieutenant Govind MD, Ivette Luu MD, Glory Marquez MD, Jada Alvares MD, Emil Andrade, [facility], [facility], CNP, Neto Gudino, CNP, Hilda Low, CNS, Jimbo Rodríguez, CNP, Wilman Coe, CNP, Dakota Chino, CNP, Nova Worthington, CNP, Albaro Hill, CNP, Callie Wyman PA-C, Liliam Kan, [facility], Esther Phillips, CNP, Wilber Ibarra, CNP, Ester Chandler, CNP, Yoli Cruz, CNP, Donald Cuellar, CNP, Cortes Romo, CNP, [facility]    Progress Note    12/5/2021    9:19 AM    Name:   Noel Connor  MRN:     [id number]     Lilliam Haywood:      [de-identified]   Room:   [address] [place] [address] Day:  1  Admit Date:  12/3/2021  5:56 PM    PCP:   Chris Marquis  Code Status:  Full Code    Subjective:     C/C:   Chief Complaint   Patient presents with   [facility] Fever     per EMS    Fatigue     last 2-3 days. Interval History Status: improved. Patient seen and examined at bedside. Has his hearing aides but having some difficulty in turning them on and placing in his ears. Otherwise feels good. Not complaining of anything. Brief History:     43-year-old male past medical history GERD, hypothyroidism, diabetes type 2, BPH, COPD, osteoporosis, CKD stage III with history of kidney mass, history of rectal cancer in 2017 presents with progressive weakness and fatigue also found positive for COVID-19. Received Regeneron Monoclonal antibodies 12/4/2021.     Review of Systems:     Constitutional:  negative for chills, fevers, sweats  Respiratory:  negative for cough, dyspnea on exertion, shortness of breath, wheezing  Cardiovascular:  negative for chest pain, chest pressure/discomfort, lower extremity edema, palpitations  Gastrointestinal:  negative for abdominal pain, constipation, diarrhea, nausea, vomiting  Neurological: Positive for weakness, negative for dizziness, headache    Medications: Allergies:  No Known Allergies    Current Meds:   Scheduled Meds:    metoprolol tartrate  25 mg Oral BID    levothyroxine  88 mcg Oral Daily    pantoprazole  40 mg Oral BID AC    trospium  20 mg Oral Nightly    azithromycin  500 mg IntraVENous Q24H    Vitamin D  2,000 Units Oral Daily    insulin lispro  0-12 Units SubCUTAneous Q4H    heparin (porcine)  5,000 Units SubCUTAneous 3 times per day    sodium chloride flush  5-40 mL IntraVENous 2 times per day     Continuous Infusions:    sodium chloride      sodium chloride       PRN Meds: labetalol, sodium chloride, EPINEPHrine, hydrALAZINE, sodium chloride flush, sodium chloride, ondansetron **OR** ondansetron, magnesium hydroxide, acetaminophen **OR** acetaminophen    Data:     Past Medical History:   has a past medical history of Anal cancer ([facility] CKD (chronic kidney disease), COPD (chronic obstructive pulmonary disease) ([facility] Diabetes mellitus ([facility] Hypothyroid, and Renal mass. Social History:   reports that he has quit smoking. He has never used smokeless tobacco. He reports previous alcohol use. He reports that he does not use drugs.      Family History:   Family History   Problem Relation Age of Onset    No Known Problems Mother     No Known Problems Father        Vitals:  /77   Pulse 101   Temp 98.1 °F (36.7 °C) (Axillary)   Resp 18   Ht 5' 10\" (1.778 m)   Wt 125 lb 8 oz (56.9 kg)   SpO2 97%   BMI 18.01 kg/m²   Temp (24hrs), Av °F (36.7 °C), Min:97.6 °F (36.4 °C), Max:98.6 °F (37 °C)    Recent Labs     21  1332 21  1823 21  0149   POCGLU 211* 176* 169* 116*       I/O (24Hr): Intake/Output Summary (Last 24 hours) at 12/5/2021 0919  Last data filed at 12/5/2021 0630  Gross per 24 hour   Intake 1050 ml   Output    Net 1050 ml       Labs:  Hematology:  Recent Labs     12/03/21 1830 12/03/21 2120 12/04/21  1433 12/05/21  0851   WBC 8.2  --  8.8 18.3*   RBC 4.55  --  4.25 4.00*   HGB 12.9*  --  12.1* 11.5*   HCT 41.0  --  38.3* 35.4*   MCV 90.1  --  90.1 88.5   MCH 28.4  --  28.5 28.8   MCHC 31.5  --  31.6 32.5   RDW 14.8*  --  14.7* 15.0*     --  234 249   MPV 9.8  --  9.7 10.0   CRP  --  112.0* 125.6*  --      Chemistry:  Recent Labs     12/03/21 1830 12/03/21 2120 12/04/21  1433   *  --  137   K 4.3  --  4.0   CL 96*  --  100   CO2 25  --  24   GLUCOSE 314*  --  168*   BUN 43*  --  42*   CREATININE 2.41*  --  2.46*   MG 2.4  --   --    ANIONGAP 12  --  13   LABGLOM 26*  --  25*   GFRAA 31*  --  30*   CALCIUM 9.6  --  9.0   PROBNP 1,295*  --   --    TROPHS 33* 34*  --    MYOGLOBIN 224* 247*  --      Recent Labs     12/03/21 1830 12/03/21 2120 12/04/21  0107 12/04/21  0543 12/04/21  1043 12/04/21  1332 12/04/21  1823 12/04/21 2001 12/05/21  0149   LABA1C 7.9*  --   --   --   --   --   --   --   --    TSH 5.22*  --   --   --   --   --   --   --   --    LDH  --  224  --   --   --   --   --   --   --    POCGLU  --   --    < > 230* 295* 211* 176* 169* 116*    < > = values in this interval not displayed. ABG:No results found for: POCPH, PHART, PH, POCPCO2, JCX9BFO, PCO2, POCPO2, PO2ART, PO2, POCHCO3, MMP5XIY, HCO3, NBEA, PBEA, BEART, BE, THGBART, THB, BTT8IFD, XEIA3QPP, V8MDHQHF, O2SAT, FIO2  Lab Results   Component Value Date/Time    SPECIAL LT FA, 2 ML 12/03/2021 09:30 PM     Lab Results   Component Value Date/Time    CULTURE NO GROWTH 1 DAY 12/03/2021 09:30 PM       Radiology:  XR SCAPULA LEFT (COMPLETE)    Result Date: 12/4/2021  No acute osseous or soft tissue abnormality.      CT CHEST WO CONTRAST    Result Date: 12/4/2021  Few subtle nodular infiltrates peripherally in the right lower lobe and right middle lobe of uncertain etiology. This may relate to an infectious or inflammatory process although follow-up chest CT is recommended in 3 months to assess resolution. XR SHOULDER LEFT (MIN 2 VIEWS)    Result Date: 12/4/2021  Degenerative change without acute osseous or soft tissue abnormality. XR CHEST PORTABLE    Result Date: 12/3/2021  Mild left basilar airspace opacities which could represent atelectasis and/or infiltrate. Physical Examination:        General appearance:  alert, cooperative, hard of hearing and in mild respiratory dyspnea on nasal cannula, comfortable. Mental Status:  oriented to person, place and not time and normal affect  Lungs: Decreased breath as well, prolonged expiratory phase  Heart:  regular rate and rhythm, systolic murmur  Abdomen:  soft, nontender, nondistended, normal bowel sounds  Extremities:  no edema, redness, tenderness in the calves  Skin:  no gross lesions, rashes, induration    Assessment:        Hospital Problems           Last Modified POA    * (Principal) Pneumonia of left lower lobe due to infectious organism 12/4/2021 Yes    COVID-19 12/4/2021 Yes    Type 2 diabetes mellitus, without long-term current use of insulin ([address] 12/3/2021 Yes    Acquired hypothyroidism 12/3/2021 Yes    Anal cancer ([address] 12/3/2021 Yes    Acute kidney injury superimposed on CKD ([address] 12/3/2021 Yes    CKD (chronic kidney disease) stage 3, GFR 30-59 ml/min ([address] 12/3/2021 Yes    Renal insufficiency syndrome 12/4/2021 Yes    Gastroesophageal reflux disease with esophagitis 12/4/2021 Yes    COPD, mild ([address] 12/4/2021 Yes    Overview Signed 12/4/2021  2:58 PM by Nayeli Madrid MD     Formatting of this note might be different from the original.  PFT on 05/10/2018 Conclusions:  mild obstructive ventilatory defect, predominantly small airway, is present consistent with COPD. Plan:        1.  Right middle and right lower

## 2021-12-05 NOTE — PROGRESS NOTES
2110 In house NP Ranjith Alfonso notified of patient's uncontrolled shaking and asked if she could come to patient's room for an assessment. 2120 In house NP Ranjith Alfonso to patient's room and stated patient was no longer shaking and resting comfortably in bed, denying any needs.

## 2021-12-05 NOTE — PLAN OF CARE
Unfortunately had a fall. Did not listen to telesitter in room or wait for help. Follow up CT head, follow up left elbow x-ray. Patient is alert and oriented to person and place, follows commands, CN II-XII grossly in tact.

## 2021-12-05 NOTE — PLAN OF CARE
Problem: Falls - Risk of:  Goal: Will remain free from falls  Description: Will remain free from falls  12/5/2021 0235 by Luna Gould RN  Outcome: Ongoing  12/4/2021 1514 by Mariola Vásquez RN  Outcome: Ongoing  Goal: Absence of physical injury  Description: Absence of physical injury  12/5/2021 0235 by Luna Gould RN  Outcome: Ongoing  12/4/2021 1514 by Mariola Vásquez RN  Outcome: Ongoing     Problem: Skin Integrity:  Goal: Will show no infection signs and symptoms  Description: Will show no infection signs and symptoms  12/5/2021 0235 by Luna Gould RN  Outcome: Ongoing  12/4/2021 1514 by Mariola Vásquez RN  Outcome: Ongoing  Goal: Absence of new skin breakdown  Description: Absence of new skin breakdown  12/5/2021 0235 by Luna Gould RN  Outcome: Ongoing  12/4/2021 1514 by Mariola Vásquez RN  Outcome: Ongoing     Problem: Airway Clearance - Ineffective  Goal: Achieve or maintain patent airway  12/5/2021 0235 by Luna Gould RN  Outcome: Ongoing  12/4/2021 1514 by Mariola Vásquez RN  Outcome: Ongoing     Problem: Gas Exchange - Impaired  Goal: Absence of hypoxia  12/5/2021 0235 by Luna Gould RN  Outcome: Ongoing  12/4/2021 1514 by Mariola Vásquez RN  Outcome: Ongoing  Goal: Promote optimal lung function  12/5/2021 0235 by Luna Gould RN  Outcome: Ongoing  12/4/2021 1514 by Mariola Vásquez RN  Outcome: Ongoing     Problem: Breathing Pattern - Ineffective  Goal: Ability to achieve and maintain a regular respiratory rate  12/5/2021 0235 by Luna Guold RN  Outcome: Ongoing  12/4/2021 1514 by Mariola Vásquez RN  Outcome: Ongoing     Problem:  Body Temperature -  Risk of, Imbalanced  Goal: Ability to maintain a body temperature within defined limits  12/5/2021 0235 by Luna Gould RN  Outcome: Ongoing  12/4/2021 1514 by Mariola Vásquez RN  Outcome: Ongoing  Goal: Will regain or maintain usual level of consciousness  12/5/2021 0235 by Luna Gould RN  Outcome: Ongoing  12/4/2021 1514 by Yao Ball Clay Amaral RN  Outcome: Ongoing  Goal: Complications related to the disease process, condition or treatment will be avoided or minimized  12/5/2021 0235 by Delvin Isaac RN  Outcome: Ongoing  12/4/2021 1514 by Erlinda Hendrickson RN  Outcome: Ongoing     Problem: Isolation Precautions - Risk of Spread of Infection  Goal: Prevent transmission of infection  12/5/2021 0235 by Delvin Isaac RN  Outcome: Ongoing  12/4/2021 1514 by Erlinda Hendrickson RN  Outcome: Ongoing     Problem: Nutrition Deficits  Goal: Optimize nutritional status  12/5/2021 0235 by Delvin Isaac RN  Outcome: Ongoing  12/4/2021 1514 by Erlinda Hendrickson RN  Outcome: Ongoing     Problem: Risk for Fluid Volume Deficit  Goal: Maintain normal heart rhythm  12/5/2021 0235 by Delvin Isaac RN  Outcome: Ongoing  12/4/2021 1514 by Erlinda Hendrickson RN  Outcome: Ongoing  Goal: Maintain absence of muscle cramping  12/5/2021 0235 by Delvin Isaac RN  Outcome: Ongoing  12/4/2021 1514 by Erlinda Hendrickson RN  Outcome: Ongoing  Goal: Maintain normal serum potassium, sodium, calcium, phosphorus, and pH  12/5/2021 0235 by Delvin Isaac RN  Outcome: Ongoing  12/4/2021 1514 by Erlinda Hendrickson RN  Outcome: Ongoing     Problem: Loneliness or Risk for Loneliness  Goal: Demonstrate positive use of time alone when socialization is not possible  12/5/2021 0235 by Delvin Isaac RN  Outcome: Ongoing  12/4/2021 1514 by Erlinda Hendrickson RN  Outcome: Ongoing     Problem: Fatigue  Goal: Verbalize increase energy and improved vitality  12/5/2021 0235 by Delvin Isaac RN  Outcome: Ongoing  12/4/2021 1514 by Erlinda Hendrickson RN  Outcome: Ongoing     Problem: Patient Education: Go to Patient Education Activity  Goal: Patient/Family Education  12/5/2021 0235 by Delvin Isaac RN  Outcome: Ongoing  12/4/2021 1514 by Erlinda Hendrickson RN  Outcome: Ongoing

## 2021-12-05 NOTE — FLOWSHEET NOTE
Many assessments from this shift have not saved appropriately for unknown reasons. Fall risk has not changed since shift began.

## 2021-12-05 NOTE — PROGRESS NOTES
Patient moved to room 1025 per bed by staff. Patient oriented to room, call light, and bed controls.

## 2021-12-05 NOTE — PLAN OF CARE
Problem: Falls - Risk of:  Goal: Will remain free from falls  Description: Will remain free from falls  12/5/2021 1440 by Sage Monroe RN  Outcome: Ongoing  12/5/2021 0235 by Delvin Isaac RN  Outcome: Ongoing  Goal: Absence of physical injury  Description: Absence of physical injury  12/5/2021 1440 by Sage Monroe RN  Outcome: Ongoing  12/5/2021 0235 by Delvin Isaac RN  Outcome: Ongoing     Problem: Skin Integrity:  Goal: Will show no infection signs and symptoms  Description: Will show no infection signs and symptoms  12/5/2021 1440 by Sage Monroe RN  Outcome: Ongoing  12/5/2021 0235 by Delvin Isaac RN  Outcome: Ongoing  Goal: Absence of new skin breakdown  Description: Absence of new skin breakdown  12/5/2021 1440 by Sage Monroe RN  Outcome: Ongoing  12/5/2021 0235 by Delvin Isaac RN  Outcome: Ongoing     Problem: Airway Clearance - Ineffective  Goal: Achieve or maintain patent airway  12/5/2021 1440 by Sage Monroe RN  Outcome: Ongoing  12/5/2021 0235 by Delvin Isaac RN  Outcome: Ongoing     Problem: Gas Exchange - Impaired  Goal: Absence of hypoxia  12/5/2021 1440 by Sage Monroe RN  Outcome: Ongoing  12/5/2021 0235 by Delvin Isaac RN  Outcome: Ongoing  Goal: Promote optimal lung function  12/5/2021 1440 by Sage Monroe RN  Outcome: Ongoing  12/5/2021 0235 by Delvin Isaac RN  Outcome: Ongoing     Problem: Breathing Pattern - Ineffective  Goal: Ability to achieve and maintain a regular respiratory rate  12/5/2021 1440 by Sage Monroe RN  Outcome: Ongoing  12/5/2021 0235 by Delvin Isaac RN  Outcome: Ongoing     Problem:  Body Temperature -  Risk of, Imbalanced  Goal: Ability to maintain a body temperature within defined limits  12/5/2021 1440 by Sage Monroe RN  Outcome: Ongoing  12/5/2021 0235 by Delvin Isaac RN  Outcome: Ongoing  Goal: Will regain or maintain usual level of consciousness  12/5/2021 1440 by Lucyann Fer, RN  Outcome: Ongoing  12/5/2021 0235 by Delvin Isaac,

## 2021-12-05 NOTE — PROGRESS NOTES
Pt tolerating monoclonal antibodies well. RN at bedside for the past fifteen minutes with no reaction. Medications at bedside. Will continue to monitor.

## 2021-12-05 NOTE — CARE COORDINATION
Discharge planning    Patient s/p fall in room. Did notify daughter Milena Mcwilliams. . She states she was not surprised. Will follow tomorrow with patient regarding rehab.

## 2021-12-06 LAB
ANION GAP SERPL CALCULATED.3IONS-SCNC: 13 MMOL/L (ref 9–17)
BUN BLDV-MCNC: 53 MG/DL (ref 8–23)
BUN/CREAT BLD: 18 (ref 9–20)
C-REACTIVE PROTEIN: 265.5 MG/L (ref 0–5)
CALCIUM SERPL-MCNC: 9 MG/DL (ref 8.6–10.4)
CHLORIDE BLD-SCNC: 99 MMOL/L (ref 98–107)
CO2: 24 MMOL/L (ref 20–31)
CREAT SERPL-MCNC: 2.93 MG/DL (ref 0.7–1.2)
GFR AFRICAN AMERICAN: 25 ML/MIN
GFR NON-AFRICAN AMERICAN: 21 ML/MIN
GFR SERPL CREATININE-BSD FRML MDRD: ABNORMAL ML/MIN/{1.73_M2}
GFR SERPL CREATININE-BSD FRML MDRD: ABNORMAL ML/MIN/{1.73_M2}
GLUCOSE BLD-MCNC: 188 MG/DL (ref 70–99)
GLUCOSE BLD-MCNC: 192 MG/DL (ref 75–110)
GLUCOSE BLD-MCNC: 192 MG/DL (ref 75–110)
GLUCOSE BLD-MCNC: 268 MG/DL (ref 75–110)
GLUCOSE BLD-MCNC: 279 MG/DL (ref 75–110)
GLUCOSE BLD-MCNC: 342 MG/DL (ref 75–110)
POTASSIUM SERPL-SCNC: 4.5 MMOL/L (ref 3.7–5.3)
SODIUM BLD-SCNC: 136 MMOL/L (ref 135–144)

## 2021-12-06 PROCEDURE — 6370000000 HC RX 637 (ALT 250 FOR IP): Performed by: INTERNAL MEDICINE

## 2021-12-06 PROCEDURE — 36415 COLL VENOUS BLD VENIPUNCTURE: CPT

## 2021-12-06 PROCEDURE — 6370000000 HC RX 637 (ALT 250 FOR IP): Performed by: NURSE PRACTITIONER

## 2021-12-06 PROCEDURE — 86140 C-REACTIVE PROTEIN: CPT

## 2021-12-06 PROCEDURE — 2060000000 HC ICU INTERMEDIATE R&B

## 2021-12-06 PROCEDURE — 97535 SELF CARE MNGMENT TRAINING: CPT

## 2021-12-06 PROCEDURE — 80048 BASIC METABOLIC PNL TOTAL CA: CPT

## 2021-12-06 PROCEDURE — 6370000000 HC RX 637 (ALT 250 FOR IP): Performed by: STUDENT IN AN ORGANIZED HEALTH CARE EDUCATION/TRAINING PROGRAM

## 2021-12-06 PROCEDURE — 97530 THERAPEUTIC ACTIVITIES: CPT

## 2021-12-06 PROCEDURE — 2580000003 HC RX 258: Performed by: STUDENT IN AN ORGANIZED HEALTH CARE EDUCATION/TRAINING PROGRAM

## 2021-12-06 PROCEDURE — 99232 SBSQ HOSP IP/OBS MODERATE 35: CPT | Performed by: STUDENT IN AN ORGANIZED HEALTH CARE EDUCATION/TRAINING PROGRAM

## 2021-12-06 PROCEDURE — 6360000002 HC RX W HCPCS: Performed by: NURSE PRACTITIONER

## 2021-12-06 PROCEDURE — 82947 ASSAY GLUCOSE BLOOD QUANT: CPT

## 2021-12-06 RX ORDER — BENZONATATE 100 MG/1
100 CAPSULE ORAL 3 TIMES DAILY PRN
Status: DISCONTINUED | OUTPATIENT
Start: 2021-12-06 | End: 2021-12-08 | Stop reason: HOSPADM

## 2021-12-06 RX ORDER — ACETAMINOPHEN AND CODEINE PHOSPHATE 300; 30 MG/1; MG/1
1 TABLET ORAL EVERY 6 HOURS PRN
Status: DISCONTINUED | OUTPATIENT
Start: 2021-12-06 | End: 2021-12-08 | Stop reason: HOSPADM

## 2021-12-06 RX ORDER — GUAIFENESIN/DEXTROMETHORPHAN 100-10MG/5
5 SYRUP ORAL EVERY 4 HOURS PRN
Status: DISCONTINUED | OUTPATIENT
Start: 2021-12-06 | End: 2021-12-08 | Stop reason: HOSPADM

## 2021-12-06 RX ADMIN — METOPROLOL TARTRATE 25 MG: 25 TABLET, FILM COATED ORAL at 09:47

## 2021-12-06 RX ADMIN — INSULIN LISPRO 2 UNITS: 100 INJECTION, SOLUTION INTRAVENOUS; SUBCUTANEOUS at 09:30

## 2021-12-06 RX ADMIN — HEPARIN SODIUM 5000 UNITS: 5000 INJECTION INTRAVENOUS; SUBCUTANEOUS at 20:55

## 2021-12-06 RX ADMIN — INSULIN LISPRO 8 UNITS: 100 INJECTION, SOLUTION INTRAVENOUS; SUBCUTANEOUS at 17:00

## 2021-12-06 RX ADMIN — INSULIN LISPRO 2 UNITS: 100 INJECTION, SOLUTION INTRAVENOUS; SUBCUTANEOUS at 05:27

## 2021-12-06 RX ADMIN — PANTOPRAZOLE SODIUM 40 MG: 40 TABLET, DELAYED RELEASE ORAL at 05:24

## 2021-12-06 RX ADMIN — HEPARIN SODIUM 5000 UNITS: 5000 INJECTION INTRAVENOUS; SUBCUTANEOUS at 13:46

## 2021-12-06 RX ADMIN — HEPARIN SODIUM 5000 UNITS: 5000 INJECTION INTRAVENOUS; SUBCUTANEOUS at 05:24

## 2021-12-06 RX ADMIN — SODIUM CHLORIDE, PRESERVATIVE FREE 10 ML: 5 INJECTION INTRAVENOUS at 09:47

## 2021-12-06 RX ADMIN — Medication 2000 UNITS: at 09:47

## 2021-12-06 RX ADMIN — INSULIN LISPRO 6 UNITS: 100 INJECTION, SOLUTION INTRAVENOUS; SUBCUTANEOUS at 20:51

## 2021-12-06 RX ADMIN — METOPROLOL TARTRATE 25 MG: 25 TABLET, FILM COATED ORAL at 20:55

## 2021-12-06 RX ADMIN — PREDNISONE 40 MG: 20 TABLET ORAL at 09:47

## 2021-12-06 RX ADMIN — ACETAMINOPHEN AND CODEINE PHOSPHATE 1 TABLET: 300; 30 TABLET ORAL at 20:55

## 2021-12-06 RX ADMIN — INSULIN LISPRO 6 UNITS: 100 INJECTION, SOLUTION INTRAVENOUS; SUBCUTANEOUS at 01:21

## 2021-12-06 RX ADMIN — LEVOTHYROXINE SODIUM 88 MCG: 0.09 TABLET ORAL at 05:24

## 2021-12-06 RX ADMIN — SODIUM CHLORIDE, PRESERVATIVE FREE 10 ML: 5 INJECTION INTRAVENOUS at 20:55

## 2021-12-06 RX ADMIN — INSULIN LISPRO 2 UNITS: 100 INJECTION, SOLUTION INTRAVENOUS; SUBCUTANEOUS at 13:46

## 2021-12-06 RX ADMIN — AZITHROMYCIN MONOHYDRATE 250 MG: 250 TABLET ORAL at 09:30

## 2021-12-06 RX ADMIN — TROSPIUM CHLORIDE 20 MG: 20 TABLET, FILM COATED ORAL at 20:55

## 2021-12-06 RX ADMIN — GUAIFENESIN AND DEXTROMETHORPHAN 5 ML: 100; 10 SYRUP ORAL at 20:55

## 2021-12-06 RX ADMIN — PANTOPRAZOLE SODIUM 40 MG: 40 TABLET, DELAYED RELEASE ORAL at 16:00

## 2021-12-06 ASSESSMENT — PAIN DESCRIPTION - LOCATION
LOCATION: SHOULDER
LOCATION: SHOULDER

## 2021-12-06 ASSESSMENT — PAIN DESCRIPTION - PAIN TYPE: TYPE: ACUTE PAIN

## 2021-12-06 ASSESSMENT — PAIN DESCRIPTION - ORIENTATION
ORIENTATION: LEFT
ORIENTATION: LEFT

## 2021-12-06 ASSESSMENT — PAIN DESCRIPTION - DESCRIPTORS
DESCRIPTORS: ACHING
DESCRIPTORS: ACHING

## 2021-12-06 ASSESSMENT — PAIN SCALES - GENERAL
PAINLEVEL_OUTOF10: 4
PAINLEVEL_OUTOF10: 2
PAINLEVEL_OUTOF10: 0

## 2021-12-06 ASSESSMENT — PAIN DESCRIPTION - ONSET: ONSET: GRADUAL

## 2021-12-06 ASSESSMENT — PAIN DESCRIPTION - DIRECTION: RADIATING_TOWARDS: DENIES

## 2021-12-06 ASSESSMENT — PAIN DESCRIPTION - FREQUENCY: FREQUENCY: INTERMITTENT

## 2021-12-06 ASSESSMENT — PAIN - FUNCTIONAL ASSESSMENT: PAIN_FUNCTIONAL_ASSESSMENT: PREVENTS OR INTERFERES SOME ACTIVE ACTIVITIES AND ADLS

## 2021-12-06 NOTE — CARE COORDINATION
Social work: Referrals faxed to [employer], [id number] [address] [facility]. Will need precert   Hens started.

## 2021-12-06 NOTE — PROGRESS NOTES
Physical Therapy  Facility/Department: [facility] PROGRESSIVE CARE  Daily Treatment Note  NAME: Amadeo Mcclelland  : 1935  MRN: [id number]    Date of Service: 2021   RN reports patient is medically stable for therapy treatment this date. Chart reviewed prior to treatment and patient is agreeable for therapy. All lines intact and patient positioned comfortably at end of treatment. All patient needs addressed prior to ending therapy session. Discharge Recommendations:  Pt currently functioning below baseline. Would suggest additional therapy at time of discharge to maximize long term outcomes and prevent re-admission. Please refer to AM-PAC score for current level of function. Patient would benefit from continued therapy after discharge      Assessment   Body structures, Functions, Activity limitations: Decreased functional mobility ; Decreased strength; Decreased safe awareness; Decreased balance; Decreased endurance; Decreased cognition; Decreased posture  Assessment: Pt tolerated session fair. Pt making progress towards STGs but continues to demonstrate significant difficulty throughout mobility at this time. Pt currently requires skilled 2 person assist for safe functional mobility at this time. Pt is a HIGH fall risk d/t decreased safety awareness, decreased balance, and decreased strength. Pt would benefit from continued skilled physical therapy to address these deficits in order to return to PLOF. Prognosis: Good  Decision Making: High Complexity  PT Education: PT Role; Energy Conservation; General Safety; Functional Mobility Training; Transfer Training; Gait Training  Patient Education: Pt educated on: safe transfers w/ RW, general safety awareness, prevention of sedentary complications, pursed lip breathing, activity tolerance, and PT POC. Pt verbalized fair understanding. Pt would benefit from continued reinforcement of education.   REQUIRES PT FOLLOW UP: Yes  Activity Tolerance  Activity Tolerance: Patient limited by cognitive status; Patient limited by endurance     Patient Diagnosis(es): The primary encounter diagnosis was COVID. A diagnosis of Pneumonia of left lower lobe due to infectious organism was also pertinent to this visit. has a past medical history of Anal cancer ([facility] [address] CKD (chronic kidney disease), COPD (chronic obstructive pulmonary disease) ([facility] [address] Diabetes mellitus ([facility] [address] [facility] Hypothyroid, and Renal mass. has a past surgical history that includes Anus surgery; Cataract removal; Colonoscopy; Upper gastrointestinal endoscopy; Rotator cuff repair; Knee arthroscopy; Vasectomy; and Finger trigger release. Restrictions  Restrictions/Precautions  Restrictions/Precautions: General Precautions, Fall Risk, Contact Precautions, Isolation, Up as Tolerated  Required Braces or Orthoses?: No  Position Activity Restriction  Other position/activity restrictions: IV, telemetry, cont SPO2 monitor, up with 2 assist  Subjective   General  Response To Previous Treatment: Patient with no complaints from previous session. Family / Caregiver Present: No  Subjective  Subjective: \"So I know we're going to do some jogging or running today right? \"  General Comment  Comments: RN and pt agreeable to therapy. Pt supine in bed upon arrival on room air w/ SpO2 95%. Pt pleasant cooperative throughout. Orientation  Orientation  Overall Orientation Status: Within Functional Limits  Cognition   Cognition  Overall Cognitive Status: Exceptions  Arousal/Alertness: Appropriate responses to stimuli; Delayed responses to stimuli  Following Commands: Follows one step commands with increased time; Follows one step commands with repetition; Inconsistently follows commands  Attention Span: Appears intact;  Attends with cues to redirect  Memory: Decreased short term memory; Decreased long term memory; Decreased recall of recent events  Safety Judgement: Decreased awareness of need for assistance; Decreased awareness of need for safety  Problem Solving: Decreased awareness of errors; Assistance required to identify errors made; Assistance required to generate solutions; Assistance required to implement solutions; Assistance required to correct errors made  Insights: Decreased awareness of deficits  Initiation: Requires cues for all  Sequencing: Requires cues for all  Cognition Comment: Pt is [place] making direction following difficult at times. Objective   Bed mobility  Rolling to Left: Maximum assistance  Rolling to Right: Maximum assistance  Supine to Sit: Moderate assistance; 2 Person assistance; Maximum assistance  Sit to Supine:  (Not assessed, pt retired in bedside chair upon writer's exit)  Comment: Pt w/ difficulty throughout bed mobility this date, requiring hand over hand cueing for progression and sequencing of movments. Pt also requiring MAX verbal cueing for initiation of movement and use of bedrails for UE assist w/ fair return demo. Pt requiring increased time and effort to complete tasks. Pt sat EOB ~10 minutes this date w/ fair sitting balance throughout. Transfers  Sit to Stand: Moderate Assistance; 2 Person Assistance  Stand to sit: Moderate Assistance; 2 Person Assistance  Bed to Chair: Moderate assistance; 2 Person Assistance  Comment: Pt w/ poor steadiness throughout transfers this date, requiring MAX verbal and tactile cueing for proper hand placement throughout transfers w/ RW w/ POOR return demo. Pt performed 2 STS transfers throughout session, demonstrating increased shakiness on initial stand requiring ~30 seconds to gain footing while standing EOB prior to initiating ambulation. Pt also demonstrating poor eccentric quad control throughout stand>sit transfer. Ambulation  Ambulation?: Yes  More Ambulation?: No  Ambulation 1  Surface: level tile  Device: Rolling Walker  Assistance:  Moderate assistance; 2 Person assistance  Quality of Gait: poor steadiness, poor safety awareness, assist for progression of RW throughout  Gait Deviations: Slow Nany; Decreased step length; Decreased step height; Shuffles; Staggers  Distance: 20ft x 4  Comments: Pt ambulating w/ poor steadiness this date requiring assist w/ progression of RW throughout. Pt also demonstrating increased shakiness this date and requiring MAX verbal cueing for sequencing throughout. SpO2 WNL throughout ambulation. Stairs/Curb  Stairs?: No     Balance  Posture: Fair  Sitting - Static: Fair; +  Sitting - Dynamic: Fair; -  Standing - Static: Fair; -  Standing - Dynamic: Poor; +  Comments: Standing balance assessed w/ RW  Exercises  Hip Flexion: Seated marches x 10 BLE  Knee Long Arc Quad: x 10 BLE  Ankle Pumps: x 15 BLE  Upper Extremity: Shoulder rows x 10  Comments: Pt requiring max verbal cueing w/ demonstration for proper technique and pacing throughout w/ fair return demo. Comment: Pt stood at sink ~5 min this date for ADLs w/ OT assist.  Pt requiring ModA for standing balance throughout. AM-PAC Score  AM-PAC Inpatient Mobility Raw Score : 12 (12/06/21 1333)  AM-PAC Inpatient T-Scale Score : 35.33 (12/06/21 1333)  Mobility Inpatient CMS 0-100% Score: 68.66 (12/06/21 1333)  Mobility Inpatient CMS G-Code Modifier : CL (12/06/21 1333)       Functional Outcome Measure-   Single Leg Stance Test:  0 sec. (<5 sec.= fall risk)    Goals  Short term goals  Time Frame for Short term goals: 14 visits  Short term goal 1: Pt to demonstrate bed mobility Kim  Short term goal 2: Pt to perform STS transfers w/ RW Kim  Short term goal 3: Pt to ambulate at least 150ft w/ RW w/ Kim  Short term goal 4: Pt to actively participate in at least 30 minutes of physical therapy for ther act, ther ex, balance, gait, and transfer training  Short term goal 5: Pt to ascend/descend 5 stairs w/ handrails ModA  Patient Goals   Patient goals :  To go home    Plan    Plan  Times per week: 1-2x/day, 5-6x/week  Current Treatment Recommendations: Strengthening, Balance Training, Functional Mobility Training, Stair training, Gait Training, Transfer Training, Endurance Training, Safety Education & Training, Home Exercise Program, Equipment Evaluation, Education, & procurement, Patient/Caregiver Education & Training  Safety Devices  Type of devices: Call light within reach, Chair alarm in place, Gait belt, Nurse notified, Cliff  in use, Left in chair  Restraints  Initially in place: No     Therapy Time   Individual Concurrent Group Co-treatment   Time In 1009         Time Out 1038         Minutes 29              Co-treatment with OT warranted secondary to decreased safety and independence requiring 2 skilled therapy professionals to address individual discipline's goals.      Muna Rm, PT

## 2021-12-06 NOTE — PROGRESS NOTES
[facility]  Office: [address], DO, Alex Fitzpatrick, DO, Diane Nathan, DO, Christie Meade Blood, DO, Gonzales Coleman MD, Suresh Kebede MD, Marilee Hernández MD, Radha Cornell MD, Ondina Pereira MD, Kali Collazo MD, Jose Mccollum MD, Liborio Barnes, DO, Juana Carbone, DO, Nessa Leach MD,  Allyson Chacon DO, Leann Huynh MD, Trey Daugherty MD, Samuel Chandler MD, Duong Cifuentes MD, Gabriela Alcala MD, Klaudia Louie MD, Roxana Garay MD, Autumn Lawrence, [facility], [facility], CNP, Reinier Marvin, CNP, Sanchez Teixeira, CNS, Yue Caldwell, CNP, Angela Mckeon, CNP, Eduard Quiñonez, CNP, Nolan Mccoy, CNP, Heather Summers, CNP, Bob Choi PA-C, Chapis Sanchez, [facility], Sherin Niño, CNP, Anya Toro, CNP, Carlos Lanier, CNP, Florentin Gallagher, CNP, Markel Cummings, CNP, Myla Haynes, [facility], Mohsen Lopez [facility]    Progress Note    12/6/2021    6:17 PM    Name:   Elizabeth Gutiérrez  MRN:     [id number]     [place]:      [de-identified]   Room:   [address] [place] [address] Day:  2  Admit Date:  12/3/2021  5:56 PM    PCP:   Mei Marquis  Code Status:  DNR-CCA    Subjective:     C/C:   Chief Complaint   Patient presents with   [facility] Fever     per EMS    Fatigue     last 2-3 days. Interval History Status: improved. Patient seen and examined at bedside. Hearing is better. Tolerating Po diet. Working with PT today. Having cough, some sore throat    Brief History:     81-year-old male past medical history GERD, hypothyroidism, diabetes type 2, BPH, COPD, osteoporosis, CKD stage III with history of kidney mass, history of rectal cancer in 2017 presents with progressive weakness and fatigue also found positive for COVID-19. Received Regeneron Monoclonal antibodies 12/4/2021.     Review of Systems:     Constitutional:  negative for chills, fevers, sweats  Respiratory:  negative for cough, dyspnea on exertion, shortness of breath, wheezing  Cardiovascular: 333* 268* 192* 192*       I/O (24Hr): Intake/Output Summary (Last 24 hours) at 12/6/2021 1817  Last data filed at 12/6/2021 1801  Gross per 24 hour   Intake 800 ml   Output    Net 800 ml       Labs:  Hematology:  Recent Labs     12/03/21 1830 12/03/21 2120 12/04/21  1433 12/05/21  0851 12/05/21  2312 12/06/21  0812   WBC   < >  --  8.8 18.3* 14.1*  --    RBC   < >  --  4.25 4.00* 3.89*  --    HGB   < >  --  12.1* 11.5* 10.9*  --    HCT   < >  --  38.3* 35.4* 33.8*  --    MCV   < >  --  90.1 88.5 86.9  --    MCH   < >  --  28.5 28.8 28.0  --    MCHC   < >  --  31.6 32.5 32.2  --    RDW   < >  --  14.7* 15.0* 14.9*  --    PLT   < >  --  234 249 244  --    MPV   < >  --  9.7 10.0 10.3  --    CRP  --  112.0* 125.6*  --   --  265.5*    < > = values in this interval not displayed. Chemistry:  Recent Labs     12/03/21 1830 12/03/21 1830 12/03/21 2120 12/04/21  1433 12/05/21  0851 12/06/21  0812   *   < >  --  137 136 136   K 4.3   < >  --  4.0 3.9 4.5   CL 96*   < >  --  100 100 99   CO2 25   < >  --  24 21 24   GLUCOSE 314*   < >  --  168* 141* 188*   BUN 43*   < >  --  42* 43* 53*   CREATININE 2.41*   < >  --  2.46* 2.63* 2.93*   MG 2.4  --   --   --   --   --    ANIONGAP 12   < >  --  13 15 13   LABGLOM 26*   < >  --  25* 23* 21*   GFRAA 31*   < >  --  30* 28* 25*   CALCIUM 9.6   < >  --  9.0 8.6 9.0   PROBNP 1,295*  --   --   --   --   --    TROPHS 33*  --  34*  --   --   --    MYOGLOBIN 224*  --  247*  --   --   --     < > = values in this interval not displayed. Recent Labs     12/03/21  1830 12/03/21  2120 12/04/21  0107 12/05/21  1041 12/05/21  1641 12/05/21  2135 12/06/21  0117 12/06/21  0523 12/06/21  1200   LABA1C 7.9*  --   --   --   --   --   --   --   --    TSH 5.22*  --   --   --   --   --   --   --   --    LDH  --  224  --   --   --   --   --   --   --    POCGLU  --   --    < > 139* 333* 333* 268* 192* 192*    < > = values in this interval not displayed.      ABG:No results found for: CKD (chronic kidney disease) stage 3, GFR 30-59 ml/min ([facility]) 12/3/2021 Yes    Renal insufficiency syndrome 12/4/2021 Yes    Gastroesophageal reflux disease with esophagitis 12/4/2021 Yes    COPD, mild ([address] 12/4/2021 Yes    Overview Signed 12/4/2021  2:58 PM by Peace Rao MD     Formatting of this note might be different from the original.  PFT on 05/10/2018 Conclusions:  mild obstructive ventilatory defect, predominantly small airway, is present consistent with COPD. Plan:        1. Right middle and right lower lobe pneumonia. Continue azithromycin. 2. COVID-19 positive. Durable power of  paperwork in chart, it is the daughter. Received regeneron mab. Symotmatically treat, on room air  3. History of CKD with baseline creatinine between 2 and 2.3  4. Moderate protein malnutrition. Consult to dietary, nutritional supplements  5. Diabetes type 2 hemoglobin A1c 7.9. Insulin sliding scale  6. GERD. Protonix twice daily  7. Hypothyroidism continue Synthroid  8. HTN. Lopressor 25 mg BID with parameters  9. DNR-CCA  10. Added tylenol 3 for pain. Tessalon robitussin for cough. 11. Discharge planningn most likely to SNF.     Peace Rao MD  12/6/2021  6:17 PM

## 2021-12-06 NOTE — CARE COORDINATION
Discharge planning    [facility]. Met with patient and face timed his daughter Ally Schaeffer. dicsussed rehabs and choices for COVID were given. 1. [place]  2. [place]     Call to [facility] and she will place referrals.

## 2021-12-06 NOTE — PROGRESS NOTES
Comprehensive Nutrition Assessment    Type and Reason for Visit:  Initial, Positive Nutrition Screen (14-23 lb weight loss, decreased appetite, malnutrition score:3 ; dietitian consult: poor intake poor appetite, weight loss)    Nutrition Recommendations/Plan:   1. Continue current diet with ONS 3x/d  2. Pt reports difficulty chewing/ swallowing with food- may benefit from swallow eval/ SLP eval  3. Monitor po intakes, ONS acceptance, weights and labs    Nutrition Assessment:  Patient admission related to COVID-19 virus. Patient placed in droplet plus precautions. Patient with dementia and extreme impaired hearing. PMH: anal cancer, renal mass Attempted to call pt- no answer. Pt consuming 50-75% of meals and % ONS. Pt report difficulty chewing and swallowing food- may need SLP eval/ swallow study. Unable to assess for malnutrition at this time. No weights per EHR. Will continue current diet and ONS. Monitor po intakes, ONS acceptance, weights and labs. Malnutrition Assessment:  Malnutrition Status:  Insufficient data      Estimated Daily Nutrient Needs:  Energy (kcal):  0098-3564 kcal (MSJ X 1.3, 1.5); Weight Used for Energy Requirements:  Current     Protein (g):  57 gm protein (1.0 g/kg) due to renal fx; Weight Used for Protein Requirements:             Nutrition Related Findings:  Active bowel sounds. No edema. COVID-19 virus. Extremely [place]. Difficulty chewing and swallowing with food. Wounds:  None       Current Nutrition Therapies:    ADULT DIET; Dysphagia - Soft and Bite Sized; 4 carb choices (60 gm/meal);  Low Fat/Low Chol/High Fiber/[place]  ADULT ORAL NUTRITION SUPPLEMENT; Breakfast, Lunch, Dinner; Diabetic Oral Supplement    Anthropometric Measures:  · Height: 5' 10\" (177.8 cm)  · Current Body Weight: 125 lb 8 oz (56.9 kg)   · Admission Body Weight: 138 lb (62.6 kg)    · Usual Body Weight: 138 lb (62.6 kg)     · Ideal Body Weight: 166 lbs; % Ideal Body Weight 75.6 %   · BMI: 18  · Adjusted Body Weight:  ; No Adjustment   · BMI Categories: Underweight (BMI less than 22) age over 72       Nutrition Diagnosis:   · Inadequate protein-energy intake related to inadequate protein-energy intake as evidenced by BMI, intake 51-75%, weight loss    Nutrition Interventions:   Food and/or Nutrient Delivery:  Continue Current Diet, Continue Oral Nutrition Supplement  Nutrition Education/Counseling:  Education not indicated   Coordination of Nutrition Care:  Continue to monitor while inpatient    Goals:  PO intake to provide >75% of estimated nutritional needs       Nutrition Monitoring and Evaluation:   Behavioral-Environmental Outcomes:  None Identified   Food/Nutrient Intake Outcomes:  Food and Nutrient Intake, Supplement Intake  Physical Signs/Symptoms Outcomes:  Biochemical Data, Chewing or Swallowing, Skin, Weight     Discharge Planning:    Continue current diet, Continue Oral Nutrition Supplement, Too soon to determine     Ni Blanchard, [address] [place] [address],   Office Number: [phone]

## 2021-12-06 NOTE — DISCHARGE INSTR - COC
Continuity of Care Form    Patient Name: Salomón Marti   :  1935  MRN:  [id number]    Admit date:  12/3/2021  Discharge date:  2021    Code Status Order: DNR-CCA   Advance Directives:      Admitting Physician:  Martín Zayas MD  PCP: Sophie Perez    Discharging Nurse: [facility] Unit/Room#: 3767/7603-32  Discharging Unit Phone Number: [phone]    Emergency Contact:   Extended Emergency Contact Information  Primary Emergency Contact: Delfino Garcia  Home Phone: [phone]  Relation: Child    Past Surgical History:  Past Surgical History:   Procedure Laterality Date    ANUS SURGERY      CATARACT REMOVAL      COLONOSCOPY      FINGER TRIGGER RELEASE      KNEE ARTHROSCOPY      ROTATOR CUFF REPAIR      UPPER GASTROINTESTINAL ENDOSCOPY      VASECTOMY         Immunization History: There is no immunization history on file for this patient.     Active Problems:  Patient Active Problem List   Diagnosis Code    COVID-19 U07.1    Type 2 diabetes mellitus, without long-term current use of insulin ([facility]) E11.9    Acquired hypothyroidism E03.9    Anal cancer ([facility]) C21.0    Acute kidney injury superimposed on CKD ([facility] [address] N17.9, N18.9    CKD (chronic kidney disease) stage 3, GFR 30-59 ml/min ([facility]) N18.30    Pneumonia of left lower lobe due to infectious organism J18.9    Renal insufficiency syndrome N28.9    Gastroesophageal reflux disease with esophagitis K21.00    COPD, mild ([facility]) J44.9       Isolation/Infection:   Isolation            Droplet Plus          Patient Infection Status       Infection Onset Added Last Indicated Last Indicated By Review Planned Expiration Resolved Resolved By    COVID-19 21 COVID-19, Rapid 12/10/21 12/17/21      S/s 12/ per H&P note    Resolved    COVID-19 (Rule Out) 21 COVID-19, Rapid (Ordered)   21 Rule-Out Test Resulted            Nurse Assessment:  Last Vital Signs: BP (!) 147/81   Pulse 79 Temp 97.2 °F (36.2 °C) (Oral)   Resp 16   Ht 5' 10\" (1.778 m)   Wt 125 lb 8 oz (56.9 kg)   SpO2 92%   BMI 18.01 kg/m²     Last documented pain score (0-10 scale): Pain Level: 2  Last Weight:   Wt Readings from Last 1 Encounters:   12/05/21 125 lb 8 oz (56.9 kg)     Mental Status:  oriented, alert, and confused at times    IV Access:  - None    Nursing Mobility/ADLs:  Walking   Assisted  Transfer  Assisted  Bathing  Assisted  Dressing  Assisted  Toileting  Assisted  Feeding  Independent  Med Admin  Assisted  Med Delivery   whole    Wound Care Documentation and Therapy:        Elimination:  Continence: Bowel: Yes  Bladder: Yes and incont. At times  Urinary Catheter: None   Colostomy/Ileostomy/Ileal Conduit: No       Date of Last BM: ***    Intake/Output Summary (Last 24 hours) at 12/6/2021 1345  Last data filed at 12/6/2021 0949  Gross per 24 hour   Intake 250 ml   Output 810 ml   Net -560 ml     I/O last 3 completed shifts:  In: -   Out: 810 [Urine:810]    Safety Concerns:     History of Falls (last 30 days) and At Risk for Falls    Impairments/Disabilities:      Hearing    Nutrition Therapy:  Current Nutrition Therapy:   - Oral Diet:  Dysphagia 3 advanced    Routes of Feeding: Oral  Liquids: No Restrictions  Daily Fluid Restriction: no  Last Modified Barium Swallow with Video (Video Swallowing Test): not done    Treatments at the Time of Hospital Discharge:   Respiratory Treatments: na   Oxygen Therapy:  is not on home oxygen therapy. Ventilator:    - No ventilator support    Rehab Therapies: Physical Therapy, Occupational Therapy, and Speech/Language Therapy  Weight Bearing Status/Restrictions: No weight bearing restirctions  Other Medical Equipment (for information only, NOT a DME order):  cane and walker  Other Treatments:   Skilled RN assessment  Will need home oxygen evaluation prior to discharge  Will need home care set up when discharged from rehab.      Patient's personal belongings (please select all that are sent with patient):  Glasses, Hearing Aides bilateral    RN SIGNATURE:  Electronically signed by Beatrice Batres RN on 12/8/21 at 3:30 PM EST    CASE MANAGEMENT/SOCIAL WORK SECTION    Inpatient Status Date: 12/4/21    Readmission Risk Assessment Score:  Readmission Risk              Risk of Unplanned Readmission:  19           Discharging to Facility/ Agency   Name: [facility]   Address: [address] [place] [address]  Phone: [phone]  Fax: [phone]        / signature: Electronically signed by López Martin RN on 12/6/21 at 1:46 PM EST    PHYSICIAN SECTION    Prognosis: Fair    Condition at Discharge: Stable    Rehab Potential (if transferring to Rehab): Fair    Recommended Labs or Other Treatments After Discharge: PT and OT evaluate and treat    Physician Certification: I certify the above information and transfer of Peter Amaro  is necessary for the continuing treatment of the diagnosis listed and that he requires [facility] for less 30 days.      Update Admission H&P: No change in H&P    PHYSICIAN SIGNATURE:  Electronically signed by Gualberto Wilkerson DO on 12/8/21 at 2:43 PM EST

## 2021-12-06 NOTE — PROGRESS NOTES
Occupational Therapy  Facility/Department: [facility] PROGRESSIVE CARE  Daily Treatment Note  NAME: Noel Connor  : 1935  MRN: [id number]    Date of Service: 2021    Discharge Recommendations:  Patient would benefit from continued therapy after discharge   Pt currently functioning below baseline. Would suggest additional therapy at time of discharge to maximize long term outcomes and prevent re-admission. Please refer to AM-PAC score for current level of function. Assessment   Performance deficits / Impairments: Decreased functional mobility ; Decreased ADL status; Decreased strength; Decreased safe awareness; Decreased cognition; Decreased endurance; Decreased balance; Decreased posture  Assessment: Pt req's 2 staff assist at this time for all self care tasks, transfers, bed mobility and functional mobility. Pt is limited by cognitive deficits and overall decreased strength and activity tolerance. Skilled OT indicated to increase safety and IND with ADLs and to increase overall strength and activity tolerance to ensure a safe return to PLOF. Prognosis: Good  REQUIRES OT FOLLOW UP: Yes  Activity Tolerance  Activity Tolerance: Patient limited by fatigue; Treatment limited secondary to decreased cognition  Activity Tolerance: P+  Safety Devices  Safety Devices in place: Yes  Type of devices: All fall risk precautions in place; Left in chair; Nurse notified; Call light within reach; Chair alarm in place; Gait belt; Patient at risk for falls         Patient Diagnosis(es): The primary encounter diagnosis was COVID. A diagnosis of Pneumonia of left lower lobe due to infectious organism was also pertinent to this visit. has a past medical history of Anal cancer ([facility] [address] CKD (chronic kidney disease), COPD (chronic obstructive pulmonary disease) ([facility] [address] Diabetes mellitus ([facility] [address] Hypothyroid, and Renal mass. has a past surgical history that includes Anus surgery; Cataract removal; Colonoscopy;  Upper gastrointestinal endoscopy; Rotator cuff repair; Knee arthroscopy; Vasectomy; and Finger trigger release. Restrictions  Restrictions/Precautions  Restrictions/Precautions: General Precautions, Fall Risk, Contact Precautions, Isolation, Up as Tolerated  Required Braces or Orthoses?: No  Position Activity Restriction  Other position/activity restrictions: IV, telemetry, cont SPO2 monitor, up with 2 assist  Subjective   General  Chart Reviewed: Yes  Patient assessed for rehabilitation services?: Yes  Response to previous treatment: Patient with no complaints from previous session  Family / Caregiver Present: No  Subjective  Subjective: Pt in bed upon arrival. Pt agreeable to therapy. Orientation  Orientation  Overall Orientation Status: Impaired  Objective    ADL  Grooming: Setup; Moderate assistance (x2 standing at sink to complete oral care. Pt very weak and unsteady at times.)  LE Dressing: Dependent/Total  Toileting: Dependent/Total  Additional Comments: Pt was DEP to complete alexy care and brief change in supine position. Balance  Sitting Balance: Stand by assistance  Standing Balance: Moderate assistance (x2)  Standing Balance  Time: 2-3 min  Activity: self care, mobility  Functional Mobility  Functional - Mobility Device: Rolling Walker  Activity: To/from bathroom; Other  Assist Level: Moderate assistance (x2)  Functional Mobility Comments: Pt completed functional mobility to/from bathroom and around room using RW with MOD A x2 for balance and safety. Pt req'd MAX verbal/tactile cues for upright posture, proper walker mgmt/use and overal safety. Pt fatigues easily. Pt on room air, SPO2 WNL. Bed mobility  Rolling to Left: Maximum assistance  Rolling to Right: Maximum assistance  Supine to Sit: Moderate assistance; 2 Person assistance; Maximum assistance  Comment: Max verbal/tactile and hand over hand cues for sequenicng movements, use of bed rail and overall safety.  Pt fearful of falling at times and can be physically resistive at times. Transfers  Stand Step Transfers: Moderate assistance; 2 Person assistance  Sit to stand: Moderate assistance; 2 Person assistance  Stand to sit: Moderate assistance; 2 Person assistance  Transfer Comments: Max verbal/tactile cues for hand placement, nose over toes, keeping walker close, squaring self up to surface prior to sitting, upright posture and overall safety. Cognition  Overall Cognitive Status: Exceptions  Arousal/Alertness: Appropriate responses to stimuli; Delayed responses to stimuli  Following Commands: Follows one step commands with increased time; Follows one step commands with repetition; Inconsistently follows commands  Attention Span: Appears intact; Attends with cues to redirect  Memory: Decreased short term memory; Decreased long term memory; Decreased recall of recent events  Safety Judgement: Decreased awareness of need for assistance; Decreased awareness of need for safety  Problem Solving: Decreased awareness of errors; Assistance required to identify errors made; Assistance required to generate solutions; Assistance required to implement solutions; Assistance required to correct errors made  Insights: Decreased awareness of deficits  Initiation: Requires cues for all  Sequencing: Requires cues for all  Cognition Comment: Pt is [place] making direction following difficult at times.      Perception  Overall Perceptual Status: Impaired  Initiation: Cues to initiate tasks                                   Plan   Plan  Times per week: 4-5x/week, 1-2x/day  Current Treatment Recommendations: Strengthening, Balance Training, Functional Mobility Training, Endurance Training, Safety Education & Training, Self-Care / ADL, Patient/Caregiver Education & Training, Equipment Evaluation, Education, & procurement, Positioning, Cognitive/Perceptual Training                                   AM-PAC Score        AM-PAC Inpatient Daily Activity Raw Score: 13 (12/06/21 1252)  AM-PAC Inpatient ADL T-Scale Score : 32.03 (12/06/21 1252)  ADL Inpatient CMS 0-100% Score: 63.03 (12/06/21 1252)  ADL Inpatient CMS G-Code Modifier : CL (12/06/21 1252)    Goals  Short term goals  Time Frame for Short term goals: by discharge, pt will  Short term goal 1: demo CGA with functional mob in room for ADL completion with min cues for safety and AD/DME as needed  Short term goal 2: demo CGA with ADL transfers with approp DME and min cues for safety  Short term goal 3: demo CGA with toileting routine with good safety  Short term goal 4: demo SBA with UB ADLs and min A LB ADLs with min cues for safety and intiation of task  Short term goal 5: demo and verb good (-) understanding of fall prevention techs, EC/WS techs, equip needs, breathing techs, and d/c recommendations  Patient Goals   Patient goals : to go home       Therapy Time   Individual Concurrent Group Co-treatment   Time In       1009   Time Out       1038   Minutes       29        Co-treatment with PT warranted secondary to decreased safety and independence requiring 2 skilled therapy professionals to address individual discipline's goals. OT addressing \"preparation for ADL transfer\",\"sitting balance for increased ADL performance\",\"sitting/activity tolerance\",\"functional reaching\",\"environmental safety/scanning\",\"fall prevention\",\"functional mobility for ADL transfers\",\"ability to sequence and follow directions\",\"functional UE strength\".       IRWIN Juares

## 2021-12-06 NOTE — PLAN OF CARE
Nutrition Problem #1: Inadequate protein-energy intake  Intervention: Food and/or Nutrient Delivery: Continue Current Diet, Continue Oral Nutrition Supplement  Nutritional Goals: PO intake to provide >75% of estimated nutritional needs

## 2021-12-06 NOTE — PLAN OF CARE
Problem: Falls - Risk of:  Goal: Will remain free from falls  Description: Will remain free from falls  12/6/2021 0300 by Marnie Hanley RN  Outcome: Ongoing  12/5/2021 1440 by Baylee Arias RN  Outcome: Ongoing  Goal: Absence of physical injury  Description: Absence of physical injury  12/6/2021 0300 by Marnie Hanley RN  Outcome: Ongoing  12/5/2021 1440 by Baylee Arias RN  Outcome: Ongoing     Problem: Skin Integrity:  Goal: Will show no infection signs and symptoms  Description: Will show no infection signs and symptoms  12/6/2021 0300 by Marnie Hanley RN  Outcome: Ongoing  12/5/2021 1440 by Baylee Arias RN  Outcome: Ongoing  Goal: Absence of new skin breakdown  Description: Absence of new skin breakdown  12/6/2021 0300 by Marnie Hanley RN  Outcome: Ongoing  12/5/2021 1440 by Baylee Arias RN  Outcome: Ongoing     Problem: Airway Clearance - Ineffective  Goal: Achieve or maintain patent airway  12/6/2021 0300 by Marnie Hanley RN  Outcome: Ongoing  12/5/2021 1440 by Baylee Arias RN  Outcome: Ongoing     Problem: Gas Exchange - Impaired  Goal: Absence of hypoxia  12/6/2021 0300 by Marnie Hanley RN  Outcome: Ongoing  12/5/2021 1440 by Baylee Arias RN  Outcome: Ongoing  Goal: Promote optimal lung function  12/6/2021 0300 by Marnie Hanley RN  Outcome: Ongoing  12/5/2021 1440 by Baylee Arias RN  Outcome: Ongoing     Problem: Breathing Pattern - Ineffective  Goal: Ability to achieve and maintain a regular respiratory rate  12/6/2021 0300 by Marnie Hanley RN  Outcome: Ongoing  12/5/2021 1440 by Baylee Arisa RN  Outcome: Ongoing     Problem:  Body Temperature -  Risk of, Imbalanced  Goal: Ability to maintain a body temperature within defined limits  12/6/2021 0300 by Marnie Hanley RN  Outcome: Ongoing  12/5/2021 1440 by Baylee Arias RN  Outcome: Ongoing  Goal: Will regain or maintain usual level of consciousness  12/6/2021 0300 by Marnie Hanley RN  Outcome: Ongoing  12/5/2021 1440 by Gustavo Lorenz RN  Outcome: Ongoing  Goal: Complications related to the disease process, condition or treatment will be avoided or minimized  12/6/2021 0300 by Ron Estes RN  Outcome: Ongoing  12/5/2021 1440 by Gustavo Lorenz RN  Outcome: Ongoing     Problem: Isolation Precautions - Risk of Spread of Infection  Goal: Prevent transmission of infection  12/6/2021 0300 by Ron Estes RN  Outcome: Ongoing  12/5/2021 1440 by Gustavo Lorenz RN  Outcome: Ongoing     Problem: Nutrition Deficits  Goal: Optimize nutritional status  12/6/2021 0300 by Ron Estes RN  Outcome: Ongoing  12/5/2021 1440 by Gustavo Lorenz RN  Outcome: Ongoing     Problem: Risk for Fluid Volume Deficit  Goal: Maintain normal heart rhythm  12/6/2021 0300 by Ron Estes RN  Outcome: Ongoing  12/5/2021 1440 by Gustavo Lorenz RN  Outcome: Ongoing  Goal: Maintain absence of muscle cramping  12/6/2021 0300 by Ron Estes RN  Outcome: Ongoing  12/5/2021 1440 by Gustavo Lorenz RN  Outcome: Ongoing  Goal: Maintain normal serum potassium, sodium, calcium, phosphorus, and pH  12/6/2021 0300 by Ron Estes RN  Outcome: Ongoing  12/5/2021 1440 by Gustavo Lorenz RN  Outcome: Ongoing     Problem: Loneliness or Risk for Loneliness  Goal: Demonstrate positive use of time alone when socialization is not possible  12/6/2021 0300 by Ron Estes RN  Outcome: Ongoing  12/5/2021 1440 by Gustavo Lorenz RN  Outcome: Ongoing     Problem: Fatigue  Goal: Verbalize increase energy and improved vitality  12/6/2021 0300 by Ron Estes RN  Outcome: Ongoing  12/5/2021 1440 by Gustavo Lorenz RN  Outcome: Ongoing     Problem: Patient Education: Go to Patient Education Activity  Goal: Patient/Family Education  12/6/2021 0300 by Ron Estes RN  Outcome: Ongoing  12/5/2021 1440 by Gustavo Lorenz RN  Outcome: Ongoing

## 2021-12-06 NOTE — PLAN OF CARE
Problem: Falls - Risk of:  Goal: Will remain free from falls  Description: Will remain free from falls  12/6/2021 0952 by Estelle Woo RN  Outcome: Ongoing  Note: Patient remains free from falls and injuries. Call light with in reach and patient close to nurse station. Will continue to round hourly and more often as needed. Telesitter in room and signage posted.      12/6/2021 0300 by Esmer Patiño RN  Outcome: Ongoing  Goal: Absence of physical injury  Description: Absence of physical injury  12/6/2021 0300 by Esmer Patiño RN  Outcome: Ongoing

## 2021-12-07 ENCOUNTER — APPOINTMENT (OUTPATIENT)
Dept: GENERAL RADIOLOGY | Age: 86
DRG: 177 | End: 2021-12-07
Payer: COMMERCIAL

## 2021-12-07 LAB
ABSOLUTE EOS #: <0.03 K/UL (ref 0–0.44)
ABSOLUTE IMMATURE GRANULOCYTE: 0.1 K/UL (ref 0–0.3)
ABSOLUTE LYMPH #: 0.75 K/UL (ref 1.1–3.7)
ABSOLUTE MONO #: 0.57 K/UL (ref 0.1–1.2)
ANION GAP SERPL CALCULATED.3IONS-SCNC: 12 MMOL/L (ref 9–17)
BASOPHILS # BLD: 0 % (ref 0–2)
BASOPHILS ABSOLUTE: <0.03 K/UL (ref 0–0.2)
BUN BLDV-MCNC: 61 MG/DL (ref 8–23)
BUN/CREAT BLD: 20 (ref 9–20)
C-REACTIVE PROTEIN: 121.7 MG/L (ref 0–5)
CALCIUM SERPL-MCNC: 9.3 MG/DL (ref 8.6–10.4)
CHLORIDE BLD-SCNC: 103 MMOL/L (ref 98–107)
CO2: 23 MMOL/L (ref 20–31)
CREAT SERPL-MCNC: 2.99 MG/DL (ref 0.7–1.2)
DIFFERENTIAL TYPE: ABNORMAL
EOSINOPHILS RELATIVE PERCENT: 0 % (ref 1–4)
GFR AFRICAN AMERICAN: 24 ML/MIN
GFR NON-AFRICAN AMERICAN: 20 ML/MIN
GFR SERPL CREATININE-BSD FRML MDRD: ABNORMAL ML/MIN/{1.73_M2}
GFR SERPL CREATININE-BSD FRML MDRD: ABNORMAL ML/MIN/{1.73_M2}
GLUCOSE BLD-MCNC: 150 MG/DL (ref 75–110)
GLUCOSE BLD-MCNC: 161 MG/DL (ref 75–110)
GLUCOSE BLD-MCNC: 173 MG/DL (ref 75–110)
GLUCOSE BLD-MCNC: 182 MG/DL (ref 70–99)
GLUCOSE BLD-MCNC: 209 MG/DL (ref 75–110)
GLUCOSE BLD-MCNC: 305 MG/DL (ref 75–110)
GLUCOSE BLD-MCNC: 381 MG/DL (ref 75–110)
HCT VFR BLD CALC: 39.6 % (ref 40.7–50.3)
HEMOGLOBIN: 12.6 G/DL (ref 13–17)
IMMATURE GRANULOCYTES: 1 %
LYMPHOCYTES # BLD: 6 % (ref 24–43)
MCH RBC QN AUTO: 28.1 PG (ref 25.2–33.5)
MCHC RBC AUTO-ENTMCNC: 31.8 G/DL (ref 28.4–34.8)
MCV RBC AUTO: 88.4 FL (ref 82.6–102.9)
MONOCYTES # BLD: 5 % (ref 3–12)
NRBC AUTOMATED: 0 PER 100 WBC
PDW BLD-RTO: 15.1 % (ref 11.8–14.4)
PLATELET # BLD: 296 K/UL (ref 138–453)
PLATELET ESTIMATE: ABNORMAL
PMV BLD AUTO: 9.8 FL (ref 8.1–13.5)
POTASSIUM SERPL-SCNC: 5.2 MMOL/L (ref 3.7–5.3)
RBC # BLD: 4.48 M/UL (ref 4.21–5.77)
RBC # BLD: ABNORMAL 10*6/UL
SEG NEUTROPHILS: 88 % (ref 36–65)
SEGMENTED NEUTROPHILS ABSOLUTE COUNT: 11.13 K/UL (ref 1.5–8.1)
SODIUM BLD-SCNC: 138 MMOL/L (ref 135–144)
WBC # BLD: 12.6 K/UL (ref 3.5–11.3)
WBC # BLD: ABNORMAL 10*3/UL

## 2021-12-07 PROCEDURE — 97535 SELF CARE MNGMENT TRAINING: CPT

## 2021-12-07 PROCEDURE — 2060000000 HC ICU INTERMEDIATE R&B

## 2021-12-07 PROCEDURE — 82947 ASSAY GLUCOSE BLOOD QUANT: CPT

## 2021-12-07 PROCEDURE — 86140 C-REACTIVE PROTEIN: CPT

## 2021-12-07 PROCEDURE — 6370000000 HC RX 637 (ALT 250 FOR IP): Performed by: STUDENT IN AN ORGANIZED HEALTH CARE EDUCATION/TRAINING PROGRAM

## 2021-12-07 PROCEDURE — 6370000000 HC RX 637 (ALT 250 FOR IP): Performed by: NURSE PRACTITIONER

## 2021-12-07 PROCEDURE — 97530 THERAPEUTIC ACTIVITIES: CPT

## 2021-12-07 PROCEDURE — 71045 X-RAY EXAM CHEST 1 VIEW: CPT

## 2021-12-07 PROCEDURE — 2580000003 HC RX 258: Performed by: STUDENT IN AN ORGANIZED HEALTH CARE EDUCATION/TRAINING PROGRAM

## 2021-12-07 PROCEDURE — 36415 COLL VENOUS BLD VENIPUNCTURE: CPT

## 2021-12-07 PROCEDURE — 2580000003 HC RX 258: Performed by: PHYSICIAN ASSISTANT

## 2021-12-07 PROCEDURE — 97112 NEUROMUSCULAR REEDUCATION: CPT

## 2021-12-07 PROCEDURE — 85025 COMPLETE CBC W/AUTO DIFF WBC: CPT

## 2021-12-07 PROCEDURE — 6370000000 HC RX 637 (ALT 250 FOR IP): Performed by: INTERNAL MEDICINE

## 2021-12-07 PROCEDURE — 6360000002 HC RX W HCPCS: Performed by: NURSE PRACTITIONER

## 2021-12-07 PROCEDURE — 99232 SBSQ HOSP IP/OBS MODERATE 35: CPT | Performed by: PHYSICIAN ASSISTANT

## 2021-12-07 PROCEDURE — 80048 BASIC METABOLIC PNL TOTAL CA: CPT

## 2021-12-07 RX ORDER — SODIUM CHLORIDE 9 MG/ML
INJECTION, SOLUTION INTRAVENOUS CONTINUOUS
Status: ACTIVE | OUTPATIENT
Start: 2021-12-07 | End: 2021-12-07

## 2021-12-07 RX ADMIN — GUAIFENESIN AND DEXTROMETHORPHAN 5 ML: 100; 10 SYRUP ORAL at 05:19

## 2021-12-07 RX ADMIN — TROSPIUM CHLORIDE 20 MG: 20 TABLET, FILM COATED ORAL at 20:13

## 2021-12-07 RX ADMIN — SODIUM CHLORIDE, PRESERVATIVE FREE 10 ML: 5 INJECTION INTRAVENOUS at 20:14

## 2021-12-07 RX ADMIN — ACETAMINOPHEN AND CODEINE PHOSPHATE 1 TABLET: 300; 30 TABLET ORAL at 20:14

## 2021-12-07 RX ADMIN — INSULIN LISPRO 2 UNITS: 100 INJECTION, SOLUTION INTRAVENOUS; SUBCUTANEOUS at 09:00

## 2021-12-07 RX ADMIN — GUAIFENESIN AND DEXTROMETHORPHAN 5 ML: 100; 10 SYRUP ORAL at 00:50

## 2021-12-07 RX ADMIN — GUAIFENESIN AND DEXTROMETHORPHAN 5 ML: 100; 10 SYRUP ORAL at 17:01

## 2021-12-07 RX ADMIN — SODIUM CHLORIDE, PRESERVATIVE FREE 10 ML: 5 INJECTION INTRAVENOUS at 09:00

## 2021-12-07 RX ADMIN — INSULIN LISPRO 8 UNITS: 100 INJECTION, SOLUTION INTRAVENOUS; SUBCUTANEOUS at 20:14

## 2021-12-07 RX ADMIN — METOPROLOL TARTRATE 25 MG: 25 TABLET, FILM COATED ORAL at 20:13

## 2021-12-07 RX ADMIN — SODIUM CHLORIDE: 9 INJECTION, SOLUTION INTRAVENOUS at 12:28

## 2021-12-07 RX ADMIN — INSULIN LISPRO 2 UNITS: 100 INJECTION, SOLUTION INTRAVENOUS; SUBCUTANEOUS at 05:12

## 2021-12-07 RX ADMIN — INSULIN LISPRO 10 UNITS: 100 INJECTION, SOLUTION INTRAVENOUS; SUBCUTANEOUS at 16:56

## 2021-12-07 RX ADMIN — LEVOTHYROXINE SODIUM 88 MCG: 0.09 TABLET ORAL at 05:20

## 2021-12-07 RX ADMIN — Medication 2000 UNITS: at 09:00

## 2021-12-07 RX ADMIN — PANTOPRAZOLE SODIUM 40 MG: 40 TABLET, DELAYED RELEASE ORAL at 05:19

## 2021-12-07 RX ADMIN — INSULIN LISPRO 4 UNITS: 100 INJECTION, SOLUTION INTRAVENOUS; SUBCUTANEOUS at 12:28

## 2021-12-07 RX ADMIN — AZITHROMYCIN MONOHYDRATE 250 MG: 250 TABLET ORAL at 09:00

## 2021-12-07 RX ADMIN — PREDNISONE 40 MG: 20 TABLET ORAL at 09:00

## 2021-12-07 RX ADMIN — METOPROLOL TARTRATE 25 MG: 25 TABLET, FILM COATED ORAL at 09:00

## 2021-12-07 RX ADMIN — INSULIN LISPRO 2 UNITS: 100 INJECTION, SOLUTION INTRAVENOUS; SUBCUTANEOUS at 00:45

## 2021-12-07 RX ADMIN — HEPARIN SODIUM 5000 UNITS: 5000 INJECTION INTRAVENOUS; SUBCUTANEOUS at 21:24

## 2021-12-07 RX ADMIN — HEPARIN SODIUM 5000 UNITS: 5000 INJECTION INTRAVENOUS; SUBCUTANEOUS at 05:19

## 2021-12-07 RX ADMIN — PANTOPRAZOLE SODIUM 40 MG: 40 TABLET, DELAYED RELEASE ORAL at 16:56

## 2021-12-07 RX ADMIN — HEPARIN SODIUM 5000 UNITS: 5000 INJECTION INTRAVENOUS; SUBCUTANEOUS at 14:15

## 2021-12-07 RX ADMIN — GUAIFENESIN AND DEXTROMETHORPHAN 5 ML: 100; 10 SYRUP ORAL at 09:00

## 2021-12-07 ASSESSMENT — PAIN SCALES - GENERAL
PAINLEVEL_OUTOF10: 0
PAINLEVEL_OUTOF10: 4
PAINLEVEL_OUTOF10: 0

## 2021-12-07 ASSESSMENT — PAIN DESCRIPTION - FREQUENCY: FREQUENCY: INTERMITTENT

## 2021-12-07 ASSESSMENT — PAIN - FUNCTIONAL ASSESSMENT: PAIN_FUNCTIONAL_ASSESSMENT: ACTIVITIES ARE NOT PREVENTED

## 2021-12-07 ASSESSMENT — PAIN DESCRIPTION - ONSET: ONSET: OTHER (COMMENT)

## 2021-12-07 ASSESSMENT — PAIN DESCRIPTION - LOCATION: LOCATION: THROAT

## 2021-12-07 ASSESSMENT — PAIN DESCRIPTION - PAIN TYPE: TYPE: ACUTE PAIN

## 2021-12-07 ASSESSMENT — PAIN DESCRIPTION - DIRECTION: RADIATING_TOWARDS: DENIES

## 2021-12-07 ASSESSMENT — PAIN DESCRIPTION - DESCRIPTORS: DESCRIPTORS: DISCOMFORT

## 2021-12-07 NOTE — PROGRESS NOTES
Occupational Therapy  Facility/Department: [facility] PROGRESSIVE CARE  Daily Treatment Note  NAME: Amadeo Mcclelland  : 1935  MRN: [id number]    Date of Service: 2021    Discharge Recommendations:  Patient would benefit from continued therapy after discharge      Pt currently functioning below baseline. Would suggest additional therapy at time of discharge to maximize long term outcomes and prevent re-admission. Please refer to AM-PAC score for current level of function. Assessment   Performance deficits / Impairments: Decreased functional mobility ; Decreased ADL status; Decreased strength; Decreased safe awareness; Decreased cognition; Decreased endurance; Decreased balance; Decreased posture  Assessment: Pt req's A LOT assist at this time for all self care tasks, transfers, bed mobility and functional mobility. Pt is limited by cognitive deficits and overall decreased strength and activity tolerance. Skilled OT indicated to increase safety and IND with ADLs and to increase overall strength and activity tolerance to ensure a safe return to PLOF. Prognosis: Good  REQUIRES OT FOLLOW UP: Yes  Activity Tolerance  Activity Tolerance: Patient Tolerated treatment well; Patient limited by fatigue  Activity Tolerance: P+  Safety Devices  Safety Devices in place: Yes  Type of devices: All fall risk precautions in place; Left in chair; Nurse notified; Call light within reach; Chair alarm in place; Gait belt; Patient at risk for falls         Patient Diagnosis(es): The primary encounter diagnosis was COVID. A diagnosis of Pneumonia of left lower lobe due to infectious organism was also pertinent to this visit. has a past medical history of Anal cancer ([facility] CKD (chronic kidney disease), COPD (chronic obstructive pulmonary disease) ([facility] [address] [facility] Diabetes mellitus ([facility] [address] Hypothyroid, and Renal mass. has a past surgical history that includes Anus surgery; Cataract removal; Colonoscopy;  Upper gastrointestinal endoscopy; Rotator cuff repair; Knee arthroscopy; Vasectomy; and Finger trigger release. Restrictions  Restrictions/Precautions  Restrictions/Precautions: General Precautions, Fall Risk, Contact Precautions, Isolation, Up as Tolerated  Required Braces or Orthoses?: No  Position Activity Restriction  Other position/activity restrictions: IV, telemetry, cont SPO2 monitor, up with assist  Subjective   General  Chart Reviewed: Yes  Patient assessed for rehabilitation services?: Yes  Response to previous treatment: Patient with no complaints from previous session  Family / Caregiver Present: No  Subjective  Subjective: Pt in bed upon arrival. Pt agreeable to therapy. \"I'm ready to go to the next phase, I'm gonna fight this tooth and nail! \"  General Comment  Comments: RN ok'd pt for therapy. Orientation  Orientation  Overall Orientation Status: Within Functional Limits  Objective    ADL  Grooming: Setup; Minimal assistance (seated EOB to complete oral care, wash face/head and to comb hair)  UE Bathing: Setup; Minimal assistance (to wash back)  LE Bathing: Maximum assistance; Dependent/Total; Setup (to bathe back alexy area)  UE Dressing: Minimal assistance; Setup (to change gowns)  LE Dressing: Maximum assistance; Dependent/Total (to change brief)        Balance  Sitting Balance: Stand by assistance  Standing Balance: Moderate assistance  Standing Balance  Time: 1-2 min  Activity: self care, mobility  Functional Mobility  Functional - Mobility Device: Rolling Walker  Activity: To/from bathroom; Other  Assist Level: Moderate assistance  Functional Mobility Comments: Pt completed functional mobility to/from bathroom and around room using RW with MOD A (level of assist varied from CGA-MIN-MOD A) for balance and safety. Pt req'd MAX verbal/tactile cues for upright posture, proper walker mgmt/use and overal safety.   Bed mobility  Supine to Sit: Moderate assistance (verbal/tactile cues for sequencing movements, use of bed rail and overall safety)  Scooting: Minimal assistance (with verbal/tactile cues to scoot self to EOB and place feet on floor.)  Transfers  Stand Step Transfers: Moderate assistance  Sit to stand: Moderate assistance  Stand to sit: Moderate assistance  Transfer Comments: Max verbal/tactile cues for hand placement, nose over toes, keeping walker close, squaring self up to surface prior to sitting, upright posture and overall safety. Cognition  Overall Cognitive Status: Exceptions  Arousal/Alertness: Appropriate responses to stimuli  Following Commands: Follows one step commands with increased time; Follows one step commands with repetition; Inconsistently follows commands  Attention Span: Appears intact; Attends with cues to redirect  Memory: Decreased short term memory; Decreased recall of recent events  Safety Judgement: Decreased awareness of need for assistance; Decreased awareness of need for safety  Problem Solving: Decreased awareness of errors; Assistance required to identify errors made; Assistance required to generate solutions; Assistance required to implement solutions; Assistance required to correct errors made  Insights: Decreased awareness of deficits  Initiation: Requires cues for some  Sequencing: Requires cues for all  Cognition Comment: Pt is [place] making direction following difficult at times.      Perception  Overall Perceptual Status: Impaired  Initiation: Cues to initiate tasks                                   Plan   Plan  Times per week: 4-5x/week, 1-2x/day  Current Treatment Recommendations: Strengthening, Balance Training, Functional Mobility Training, Endurance Training, Safety Education & Training, Self-Care / ADL, Patient/Caregiver Education & Training, Equipment Evaluation, Education, & procurement, Positioning, Cognitive/Perceptual Training                        AM-PAC Score        AM-[facility] Inpatient Daily Activity Raw Score: 14 (12/07/21 1036)  AM-PAC Inpatient ADL T-Scale Score : 33.39 (12/07/21 1036)  ADL Inpatient CMS 0-100% Score: 59.67 (12/07/21 1036)  ADL Inpatient CMS G-Code Modifier : CK (12/07/21 1036)    Goals  Short term goals  Time Frame for Short term goals: by discharge, pt will  Short term goal 1: demo CGA with functional mob in room for ADL completion with min cues for safety and AD/DME as needed  Short term goal 2: demo CGA with ADL transfers with approp DME and min cues for safety  Short term goal 3: demo CGA with toileting routine with good safety  Short term goal 4: demo SBA with UB ADLs and min A LB ADLs with min cues for safety and intiation of task  Short term goal 5: demo and verb good (-) understanding of fall prevention techs, EC/WS techs, equip needs, breathing techs, and d/c recommendations  Patient Goals   Patient goals : to go home       Therapy Time   Individual Concurrent Group Co-treatment   Time In 1004         Time Out 1045         Minutes [id number] [facility]

## 2021-12-07 NOTE — PROGRESS NOTES
[facility]  Office: [address], DO, Suzi Iveth, DO, Nicky Duane, DO, Denny Gastelum Blood, DO, Ferdinand De Anda MD, Ryan Lee MD, Carlos Martin MD, Francis Maier MD, Emma Loo MD, Dakota Nichole MD, Daryl Clifford MD, Roxy Atwood, DO, Suzanne Suazo, DO, Lida Vogt MD,  Cindi Chavez, DO, Tal Shelby MD, Angela Castillo MD, Esther Gabriel MD, Oscar Peraza MD, Rossi Calvin MD, Sandra Saini MD, Tram Perrin MD, Rabia Segura [facility], [facility] Mitra, CNP, Perez Cuevas, CNP, Tanda Lesches, CNS, Shahram Carpio, CNP, Winsome Purvis, CNP, Keiko Duarndtt, CNP, Lien Alexis, CNP, Delbert Aase, CNP, EMMA OrtegaC, Roopa López, [facility], Pedro Evans, CNP, Sanam Hyman, CNP, Florence Dale, CNP, Thom Zuñiga, CNP, Mattie Day, [facility], [place], [facility], Lora Valenzuela, [address] [place] [address]    Progress Note    12/7/2021    9:32 AM    Name:   Rajesh Thurman  MRN:     [id number]     [place]:      [de-identified]   Room:   [address] [place] [address] Day:  3  Admit Date:  12/3/2021  5:56 PM    PCP:   Hiren Marquis  Code Status:  DNR-CCA    Subjective:     C/C:   Chief Complaint   Patient presents with   Leonel Roderick Fever     per EMS    Fatigue     last 2-3 days. Interval History Status: improved. Patient was seen and evaluated this morning. Patient is reporting improvement in his symptoms. He continues to have cough. He is denying shortness of breath currently. He feels that he is getting stronger. He is denying any new complaints. He is denying fevers, chills, body aches, headache. 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 Patient presented to the emergency department complaining of 2 to 3-day history of fatigue and fever. Patient was found to be positive for COVID-19. Chest CT revealing subtle nodular infiltrates peripherally in the right lower lobe and right middle lobe. Patient was admitted to the medicine service for COVID-19 pneumonia. He received Regeneron monoclonal antibody on December 4. He continues to receive treatment with azithromycin for community-acquired pneumonia. Review of Systems:     Constitutional:  negative for chills, fevers, sweats  Respiratory:  negative for cough, dyspnea on exertion, shortness of breath, wheezing  Cardiovascular:  negative for chest pain, chest pressure/discomfort, lower extremity edema, palpitations  Gastrointestinal:  negative for abdominal pain, constipation, diarrhea, nausea, vomiting  Neurological:  negative for dizziness, headache    Medications: Allergies:  No Known Allergies    Current Meds:   Scheduled Meds:    metoprolol tartrate  25 mg Oral BID    azithromycin  250 mg Oral Daily    levothyroxine  88 mcg Oral Daily    pantoprazole  40 mg Oral BID AC    trospium  20 mg Oral Nightly    Vitamin D  2,000 Units Oral Daily    insulin lispro  0-12 Units SubCUTAneous Q4H    heparin (porcine)  5,000 Units SubCUTAneous 3 times per day    sodium chloride flush  5-40 mL IntraVENous 2 times per day     Continuous Infusions:    sodium chloride      sodium chloride       PRN Meds: acetaminophen-codeine, benzonatate, guaiFENesin-dextromethorphan, sodium chloride, EPINEPHrine, sodium chloride flush, sodium chloride, ondansetron **OR** ondansetron, magnesium hydroxide, acetaminophen **OR** acetaminophen    Data:     Past Medical History:   has a past medical history of Anal cancer ([facility] [address] CKD (chronic kidney disease), COPD (chronic obstructive pulmonary disease) ([facility] [address] [facility] Hypothyroid, and Renal mass. Social History:   reports that he has quit smoking. He has never used smokeless tobacco. He reports previous alcohol use.  He reports that he does not use drugs. Family History:   Family History   Problem Relation Age of Onset    No Known Problems Mother     No Known Problems Father        Vitals:  /74   Pulse 72   Temp 97.5 °F (36.4 °C) (Oral)   Resp 16   Ht 5' 10\" (1.778 m)   Wt 123 lb 14.4 oz (56.2 kg)   SpO2 94%   BMI 17.78 kg/m²   Temp (24hrs), Av.8 °F (36.6 °C), Min:97.2 °F (36.2 °C), Max:98.6 °F (37 °C)    Recent Labs     218 21  0024 21  0443 21  0836   POCGLU 279* 173* 161* 150*       I/O (24Hr):     Intake/Output Summary (Last 24 hours) at 2021 0932  Last data filed at 2021 0441  Gross per 24 hour   Intake 800 ml   Output 745 ml   Net 55 ml       Labs:  Hematology:  Recent Labs     21  1433 21  0851 21  2312 21  0812   WBC 8.8 18.3* 14.1*  --    RBC 4.25 4.00* 3.89*  --    HGB 12.1* 11.5* 10.9*  --    HCT 38.3* 35.4* 33.8*  --    MCV 90.1 88.5 86.9  --    MCH 28.5 28.8 28.0  --    MCHC 31.6 32.5 32.2  --    RDW 14.7* 15.0* 14.9*  --     249 244  --    MPV 9.7 10.0 10.3  --    .6*  --   --  265.5*     Chemistry:  Recent Labs     21  1433 21  0851 21  0812    136 136   K 4.0 3.9 4.5    100 99   CO2 24 21 24   GLUCOSE 168* 141* 188*   BUN 42* 43* 53*   CREATININE 2.46* 2.63* 2.93*   ANIONGAP 13 15 13   LABGLOM 25* 23* 21*   GFRAA 30* 28* 25*   CALCIUM 9.0 8.6 9.0     Recent Labs     21  1200 21  1617 21  2048 21  0024 21  0443 21  0836   POCGLU 192* 342* 279* 173* 161* 150*     ABG:No results found for: POCPH, PHART, PH, POCPCO2, IEK4SKE, PCO2, POCPO2, PO2ART, PO2, POCHCO3, MYZ1ZCT, HCO3, NBEA, PBEA, BEART, BE, THGBART, THB, SVZ4IML, CPFB2GGL, X8DOWKML, O2SAT, FIO2  Lab Results   Component Value Date/Time    SPECIAL LT FA, 2 ML 2021 09:30 PM     Lab Results   Component Value Date/Time    CULTURE NO GROWTH 3 DAYS 2021 09:30 PM       Radiology:  XR SCAPULA LEFT (COMPLETE)    Result Date: esophagitis 12/4/2021 Yes    COPD, mild ([address] 12/4/2021 Yes    Overview Signed 12/4/2021  2:58 PM by Denice Barnett MD     Formatting of this note might be different from the original.  PFT on 05/10/2018 Conclusions:  mild obstructive ventilatory defect, predominantly small airway, is present consistent with COPD. Plan:        #COVID-19 infection  -Received Regeneron monoclonal antibody on 12/4. Currently hemodynamically stable and not requiring supplemental oxygen. #Right lung nodules  -Infectious versus inflammatory. Patient remains on azithromycin. Recommend repeat CT scan of the chest in 3 months to assure resolution given patient's malignancy history    #Acute kidney injury on chronic kidney disease stage III  -Start IV normal saline at 50 mL/h. Continue to monitor creatinine daily.     #Type 2 diabetes mellitus  -Glucose checks AC at bedtime, insulin sliding scale    #Stable for discharge to skilled nursing facility    Viola Charles PA-C  12/7/2021  9:32 AM

## 2021-12-07 NOTE — PLAN OF CARE
Problem: Falls - Risk of:  Goal: Will remain free from falls  Description: Will remain free from falls  12/6/2021 2221 by Joss Lanier RN  Outcome: Ongoing  Note: Pt free from falls, fall risk education reinforced, bed alarm maintained this shift. Problem: Skin Integrity:  Goal: Absence of new skin breakdown  Description: Absence of new skin breakdown  Outcome: Ongoing  Note: Skin assessment completed, no new breakdown noted this shift. Problem: Airway Clearance - Ineffective  Goal: Achieve or maintain patent airway  Outcome: Ongoing  Note: Pt maintained patent airway this shift. Problem: Gas Exchange - Impaired  Goal: Absence of hypoxia  Outcome: Ongoing  Note: Continuous pulse ox in place, no signs of hypoxia noted this shift. Problem: Body Temperature -  Risk of, Imbalanced  Goal: Ability to maintain a body temperature within defined limits  Outcome: Ongoing  Note: Pt afebrile this shift. Problem: Pain:  Goal: Control of acute pain  Description: Control of acute pain  Outcome: Ongoing  Note: Pt verbalizes adequate management of pain this shift.

## 2021-12-07 NOTE — PLAN OF CARE
Problem: Falls - Risk of:  Goal: Will remain free from falls  Description: Will remain free from falls. Fall risk assessment completed. Patient instructed to use call light. Bed locked and in lowest position, side rails up 2/4, call light and bedside table within reach, clutter removed, and non-skid footwear on when pt out of bed. Hourly rounds will continue. Bed alarm in use.    Outcome: Ongoing     Problem: Skin Integrity:  Goal: Absence of new skin breakdown  Description: Absence of new skin breakdown  Outcome: Ongoing     Problem: Gas Exchange - Impaired  Goal: Absence of hypoxia  Outcome: Ongoing

## 2021-12-07 NOTE — PROGRESS NOTES
Patient called out while eating dinner stating that he was \"choking\". Writer immediately entered room & patient was not choking but was having difficulty with dinner. Writer assisted patient to an upright position and took remaining dinner away for now. Patient provided with bucket and is gurgling attempting to bring up food. Patient states that he is no longer in any distress. Oxygen saturation at 96% on RA. Physician notified. Awaiting new orders.

## 2021-12-08 VITALS
TEMPERATURE: 97.9 F | WEIGHT: 130 LBS | DIASTOLIC BLOOD PRESSURE: 78 MMHG | HEIGHT: 70 IN | OXYGEN SATURATION: 92 % | HEART RATE: 71 BPM | SYSTOLIC BLOOD PRESSURE: 161 MMHG | RESPIRATION RATE: 16 BRPM | BODY MASS INDEX: 18.61 KG/M2

## 2021-12-08 LAB
ABSOLUTE EOS #: 0 K/UL (ref 0–0.4)
ABSOLUTE IMMATURE GRANULOCYTE: 0.21 K/UL (ref 0–0.3)
ABSOLUTE LYMPH #: 0.52 K/UL (ref 1–4.8)
ABSOLUTE MONO #: 0.52 K/UL (ref 0.2–0.8)
ANION GAP SERPL CALCULATED.3IONS-SCNC: 11 MMOL/L (ref 9–17)
BASOPHILS # BLD: 0 %
BASOPHILS ABSOLUTE: 0 K/UL (ref 0–0.2)
BUN BLDV-MCNC: 60 MG/DL (ref 8–23)
BUN/CREAT BLD: 21 (ref 9–20)
CALCIUM SERPL-MCNC: 9 MG/DL (ref 8.6–10.4)
CHLORIDE BLD-SCNC: 103 MMOL/L (ref 98–107)
CO2: 22 MMOL/L (ref 20–31)
CREAT SERPL-MCNC: 2.85 MG/DL (ref 0.7–1.2)
DIFFERENTIAL TYPE: ABNORMAL
EOSINOPHILS RELATIVE PERCENT: 0 % (ref 1–4)
GFR AFRICAN AMERICAN: 26 ML/MIN
GFR NON-AFRICAN AMERICAN: 21 ML/MIN
GFR SERPL CREATININE-BSD FRML MDRD: ABNORMAL ML/MIN/{1.73_M2}
GFR SERPL CREATININE-BSD FRML MDRD: ABNORMAL ML/MIN/{1.73_M2}
GLUCOSE BLD-MCNC: 170 MG/DL (ref 75–110)
GLUCOSE BLD-MCNC: 180 MG/DL (ref 75–110)
GLUCOSE BLD-MCNC: 193 MG/DL (ref 75–110)
GLUCOSE BLD-MCNC: 198 MG/DL (ref 70–99)
GLUCOSE BLD-MCNC: 208 MG/DL (ref 75–110)
GLUCOSE BLD-MCNC: 222 MG/DL (ref 75–110)
HCT VFR BLD CALC: 39.4 % (ref 40.7–50.3)
HEMOGLOBIN: 12.3 G/DL (ref 13–17)
IMMATURE GRANULOCYTES: 2 %
LYMPHOCYTES # BLD: 5 % (ref 24–44)
MCH RBC QN AUTO: 28 PG (ref 25.2–33.5)
MCHC RBC AUTO-ENTMCNC: 31.2 G/DL (ref 28.4–34.8)
MCV RBC AUTO: 89.5 FL (ref 82.6–102.9)
MONOCYTES # BLD: 5 % (ref 1–7)
NRBC AUTOMATED: 0 PER 100 WBC
PDW BLD-RTO: 14.8 % (ref 11.8–14.4)
PLATELET # BLD: 323 K/UL (ref 138–453)
PLATELET ESTIMATE: ABNORMAL
PMV BLD AUTO: 10 FL (ref 8.1–13.5)
POTASSIUM SERPL-SCNC: 4.9 MMOL/L (ref 3.7–5.3)
RBC # BLD: 4.4 M/UL (ref 4.21–5.77)
RBC # BLD: ABNORMAL 10*6/UL
SEG NEUTROPHILS: 88 % (ref 36–66)
SEGMENTED NEUTROPHILS ABSOLUTE COUNT: 9.05 K/UL (ref 1.8–7.7)
SODIUM BLD-SCNC: 136 MMOL/L (ref 135–144)
WBC # BLD: 10.3 K/UL (ref 3.5–11.3)
WBC # BLD: ABNORMAL 10*3/UL

## 2021-12-08 PROCEDURE — 97530 THERAPEUTIC ACTIVITIES: CPT

## 2021-12-08 PROCEDURE — 80048 BASIC METABOLIC PNL TOTAL CA: CPT

## 2021-12-08 PROCEDURE — 97110 THERAPEUTIC EXERCISES: CPT

## 2021-12-08 PROCEDURE — 6370000000 HC RX 637 (ALT 250 FOR IP): Performed by: STUDENT IN AN ORGANIZED HEALTH CARE EDUCATION/TRAINING PROGRAM

## 2021-12-08 PROCEDURE — 6370000000 HC RX 637 (ALT 250 FOR IP): Performed by: NURSE PRACTITIONER

## 2021-12-08 PROCEDURE — 36415 COLL VENOUS BLD VENIPUNCTURE: CPT

## 2021-12-08 PROCEDURE — 99232 SBSQ HOSP IP/OBS MODERATE 35: CPT | Performed by: INTERNAL MEDICINE

## 2021-12-08 PROCEDURE — 2580000003 HC RX 258: Performed by: STUDENT IN AN ORGANIZED HEALTH CARE EDUCATION/TRAINING PROGRAM

## 2021-12-08 PROCEDURE — 94761 N-INVAS EAR/PLS OXIMETRY MLT: CPT

## 2021-12-08 PROCEDURE — 85025 COMPLETE CBC W/AUTO DIFF WBC: CPT

## 2021-12-08 PROCEDURE — 6370000000 HC RX 637 (ALT 250 FOR IP): Performed by: INTERNAL MEDICINE

## 2021-12-08 PROCEDURE — 6360000002 HC RX W HCPCS: Performed by: NURSE PRACTITIONER

## 2021-12-08 RX ORDER — DEXTROSE MONOHYDRATE 50 MG/ML
100 INJECTION, SOLUTION INTRAVENOUS PRN
Status: DISCONTINUED | OUTPATIENT
Start: 2021-12-08 | End: 2021-12-08 | Stop reason: HOSPADM

## 2021-12-08 RX ORDER — NICOTINE POLACRILEX 4 MG
15 LOZENGE BUCCAL PRN
Status: DISCONTINUED | OUTPATIENT
Start: 2021-12-08 | End: 2021-12-08 | Stop reason: HOSPADM

## 2021-12-08 RX ORDER — DEXTROSE MONOHYDRATE 25 G/50ML
12.5 INJECTION, SOLUTION INTRAVENOUS PRN
Status: DISCONTINUED | OUTPATIENT
Start: 2021-12-08 | End: 2021-12-08 | Stop reason: HOSPADM

## 2021-12-08 RX ORDER — NICOTINE POLACRILEX 4 MG
15 LOZENGE BUCCAL PRN
Qty: 45 G | Refills: 1 | DISCHARGE
Start: 2021-12-08

## 2021-12-08 RX ORDER — GUAIFENESIN/DEXTROMETHORPHAN 100-10MG/5
5 SYRUP ORAL EVERY 4 HOURS PRN
Qty: 120 ML | DISCHARGE
Start: 2021-12-08 | End: 2021-12-18

## 2021-12-08 RX ORDER — CHOLECALCIFEROL (VITAMIN D3) 50 MCG
2000 TABLET ORAL DAILY
Qty: 60 TABLET | DISCHARGE
Start: 2021-12-09

## 2021-12-08 RX ORDER — BENZONATATE 100 MG/1
100 CAPSULE ORAL 3 TIMES DAILY PRN
DISCHARGE
Start: 2021-12-08 | End: 2021-12-15

## 2021-12-08 RX ADMIN — GUAIFENESIN AND DEXTROMETHORPHAN 5 ML: 100; 10 SYRUP ORAL at 17:39

## 2021-12-08 RX ADMIN — PANTOPRAZOLE SODIUM 40 MG: 40 TABLET, DELAYED RELEASE ORAL at 05:25

## 2021-12-08 RX ADMIN — LEVOTHYROXINE SODIUM 88 MCG: 0.09 TABLET ORAL at 05:25

## 2021-12-08 RX ADMIN — SODIUM CHLORIDE, PRESERVATIVE FREE 5 ML: 5 INJECTION INTRAVENOUS at 08:44

## 2021-12-08 RX ADMIN — METOPROLOL TARTRATE 25 MG: 25 TABLET, FILM COATED ORAL at 09:49

## 2021-12-08 RX ADMIN — GUAIFENESIN AND DEXTROMETHORPHAN 5 ML: 100; 10 SYRUP ORAL at 12:30

## 2021-12-08 RX ADMIN — INSULIN LISPRO 4 UNITS: 100 INJECTION, SOLUTION INTRAVENOUS; SUBCUTANEOUS at 17:39

## 2021-12-08 RX ADMIN — HEPARIN SODIUM 5000 UNITS: 5000 INJECTION INTRAVENOUS; SUBCUTANEOUS at 05:25

## 2021-12-08 RX ADMIN — BENZONATATE 100 MG: 100 CAPSULE ORAL at 14:30

## 2021-12-08 RX ADMIN — HEPARIN SODIUM 5000 UNITS: 5000 INJECTION INTRAVENOUS; SUBCUTANEOUS at 14:30

## 2021-12-08 RX ADMIN — GUAIFENESIN AND DEXTROMETHORPHAN 5 ML: 100; 10 SYRUP ORAL at 00:00

## 2021-12-08 RX ADMIN — PANTOPRAZOLE SODIUM 40 MG: 40 TABLET, DELAYED RELEASE ORAL at 17:39

## 2021-12-08 RX ADMIN — AZITHROMYCIN MONOHYDRATE 250 MG: 250 TABLET ORAL at 08:44

## 2021-12-08 RX ADMIN — INSULIN LISPRO 2 UNITS: 100 INJECTION, SOLUTION INTRAVENOUS; SUBCUTANEOUS at 12:31

## 2021-12-08 RX ADMIN — Medication 2000 UNITS: at 08:44

## 2021-12-08 RX ADMIN — GUAIFENESIN AND DEXTROMETHORPHAN 5 ML: 100; 10 SYRUP ORAL at 08:45

## 2021-12-08 RX ADMIN — INSULIN LISPRO 2 UNITS: 100 INJECTION, SOLUTION INTRAVENOUS; SUBCUTANEOUS at 08:45

## 2021-12-08 RX ADMIN — INSULIN LISPRO 2 UNITS: 100 INJECTION, SOLUTION INTRAVENOUS; SUBCUTANEOUS at 05:25

## 2021-12-08 RX ADMIN — INSULIN LISPRO 4 UNITS: 100 INJECTION, SOLUTION INTRAVENOUS; SUBCUTANEOUS at 00:00

## 2021-12-08 ASSESSMENT — PAIN SCALES - GENERAL
PAINLEVEL_OUTOF10: 0

## 2021-12-08 NOTE — PROGRESS NOTES
[facility]  Office: [address], DO, Alex Pole, DO, Irena Shultz, DO, Vaughn Leavitt Blood, DO, Bernardo Michelle MD, Tara Uriostegui MD, Marlon Kirk MD, Aram Beckwith MD, Kerrie Canales MD, Michell Ackerman MD, Domi Mendes MD, Real Villarreal, DO, Gaurang Reynolds, DO, Natalie Mesa MD,  Mignon Curry, DO, Tammi Serrano MD, Marcus Dominguez MD, Francisco Virk MD, Betsy Ramirez MD, Arpit Newman MD, Nevin Castillo MD, Tj Crocker MD, Yajaira Gaston, [facility], [facility], [facility], Lucía Hartmann, [facility], Luna Monsivais, CNS, Aristides Zafar, CNP, Claribel Dodd, CNP, Annita Hatfield, CNP, Dominique Carson, CNP, Arsalan Walsh, CNP, Sanjana Horton PA-C, Damion Patel [facility], Mary Collins, CNP, Itz Mendoza, CNP, Andrey Luna, CNP, David Rodriguez, CNP, Andria Kam, CNP, Kavon Reilly, CNP, [facility]    Progress Note    12/8/2021    7:31 AM    Name:   Russell Jules  MRN:     [id number]     [place]:      [de-identified]   Room:   [address] [place] [address] Day:  4  Admit Date:  12/3/2021  5:56 PM    PCP:   Nunu Bor  Code Status:  DNR-CCA    Subjective:     C/C:   Chief Complaint   Patient presents with   Theodoro Milder Fever     per EMS    Fatigue     last 2-3 days. Interval History Status: improved. Patient is resting comfortably, states he feels better than he is felt in years. Denies any chest pain, shortness of breath, nausea or vomiting, fevers or chills. 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 Patient presented to the emergency department complaining of 2 to 3-day history of fatigue and fever. Patient was found to be positive for COVID-19.   Chest CT revealing subtle nodular infiltrates peripherally in the right lower lobe and right middle lobe. Patient was admitted to the medicine service for COVID-19 pneumonia. He received Regeneron monoclonal antibody on December 4. He continues to receive treatment with azithromycin for community-acquired pneumonia. Review of Systems:     Constitutional:  negative for chills, fevers, sweats  Respiratory:  negative for cough, dyspnea on exertion, shortness of breath, wheezing  Cardiovascular:  negative for chest pain, chest pressure/discomfort, lower extremity edema, palpitations  Gastrointestinal:  negative for abdominal pain, constipation, diarrhea, nausea, vomiting  Neurological:  negative for dizziness, headache    Medications: Allergies:  No Known Allergies    Current Meds:   Scheduled Meds:    metoprolol tartrate  25 mg Oral BID    azithromycin  250 mg Oral Daily    levothyroxine  88 mcg Oral Daily    pantoprazole  40 mg Oral BID AC    trospium  20 mg Oral Nightly    Vitamin D  2,000 Units Oral Daily    insulin lispro  0-12 Units SubCUTAneous Q4H    heparin (porcine)  5,000 Units SubCUTAneous 3 times per day    sodium chloride flush  5-40 mL IntraVENous 2 times per day     Continuous Infusions:    sodium chloride      sodium chloride       PRN Meds: acetaminophen-codeine, benzonatate, guaiFENesin-dextromethorphan, sodium chloride, EPINEPHrine, sodium chloride flush, sodium chloride, ondansetron **OR** ondansetron, magnesium hydroxide, acetaminophen **OR** acetaminophen    Data:     Past Medical History:   has a past medical history of Anal cancer ([facility] CKD (chronic kidney disease), COPD (chronic obstructive pulmonary disease) ([facility] [facility] Hypothyroid, and Renal mass. Social History:   reports that he has quit smoking. He has never used smokeless tobacco. He reports previous alcohol use. He reports that he does not use drugs.      Family History:   Family History   Problem Relation Age of Onset    No Known Problems Mother     No Known Problems Father        Vitals:  BP (!) 156/78   Pulse 72   Temp 97.5 °F (36.4 °C) (Oral)   Resp 18   Ht 5' 10\" (1.778 m)   Wt 130 lb (59 kg)   SpO2 97%   BMI 18.65 kg/m²   Temp (24hrs), Av.7 °F (36.5 °C), Min:97.5 °F (36.4 °C), Max:98.1 °F (36.7 °C)    Recent Labs     21  1640 21  2349 21  0442   POCGLU 381* 305* 208* 193*       I/O (24Hr): Intake/Output Summary (Last 24 hours) at 2021 0731  Last data filed at 2021 0532  Gross per 24 hour   Intake 1556.34 ml   Output 750 ml   Net 806.34 ml       Labs:  Hematology:  Recent Labs     21  2312 21  0812 21  0939 21  0644   WBC 14.1*  --  12.6* 10.3   RBC 3.89*  --  4.48 4.40   HGB 10.9*  --  12.6* 12.3*   HCT 33.8*  --  39.6* 39.4*   MCV 86.9  --  88.4 89.5   MCH 28.0  --  28.1 28.0   MCHC 32.2  --  31.8 31.2   RDW 14.9*  --  15.1* 14.8*     --  296 323   MPV 10.3  --  9.8 10.0   CRP  --  265.5* 121.7*  --      Chemistry:  Recent Labs     21  0812 21  0939 21  0644    138 136   K 4.5 5.2 4.9   CL 99 103 103   CO2 24 23 22   GLUCOSE 188* 182* 198*   BUN 53* 61* 60*   CREATININE 2.93* 2.99* 2.85*   ANIONGAP 13 12 11   LABGLOM 21* 20* 21*   GFRAA 25* 24* 26*   CALCIUM 9.0 9.3 9.0     Recent Labs     21  0836 21  1146 21  1640 21  2008 21  2349 21  0442   POCGLU 150* 209* 381* 305* 208* 193*     ABG:No results found for: POCPH, PHART, PH, POCPCO2, IQZ8OZF, PCO2, POCPO2, PO2ART, PO2, POCHCO3, XPN9RMX, HCO3, NBEA, PBEA, BEART, BE, THGBART, THB, JSW5NSB, ZGKK3KHL, S8KLXAHS, O2SAT, FIO2  Lab Results   Component Value Date/Time    SPECIAL LT FA, 2 ML 2021 09:30 PM     Lab Results   Component Value Date/Time    CULTURE NO GROWTH 4 DAYS 2021 09:30 PM       Radiology:  XR SCAPULA LEFT (COMPLETE)    Result Date: 2021  No acute osseous or soft tissue abnormality.      XR ELBOW LEFT (MIN 3 VIEWS)    Result Date: disease with esophagitis 12/7/2021 Yes    COPD, mild ([address] 12/7/2021 Yes    Overview Signed 12/4/2021  2:58 PM by Juan Francisco Matute MD     Formatting of this note might be different from the original.  PFT on 05/10/2018 Conclusions:  mild obstructive ventilatory defect, predominantly small airway, is present consistent with COPD. Plan:        1. Continue present medications  2. PT and OT  3. Monitor renal function  4. Insulin scale for glycemic control  5. Avoid nephrotoxic agents  6. Aerosols as needed  7. GI and DVT prophylaxis  8.  Discharge planning to skilled facility    Chepe Cheney DO  12/8/2021  7:31 AM

## 2021-12-08 NOTE — CARE COORDINATION
Social work: Patient accepted at [facility], anticipate discharge today. Patient to dc to [facility] via [address] [facility]  at [facility].  # for RN report: [phone]. Completed KAUR faxed to [phone]. Informed RN, pt, and facility of dc time, agreeable to plan. HENS needs completed when dc order is in.

## 2021-12-08 NOTE — DISCHARGE SUMMARY
[facility]  Office: [address], DO, Tray Pena, DO, Eva Perea, DO, Bindu Bautista, DO, Ranjan Gurrola MD, Harman Rueda MD, Rosemary Cordova MD, Joelle Wilson MD, Tawny Silverman MD, Kathleen Abdi MD, Glo Moritz, MD, Adam Handy, DO, Tommy Mcnamara, DO, Zari Bloom MD,  Silvina Mclean, DO, Joseph Hernandez MD, Rekha Keene MD, Didier Byrne MD, Oswaldo Martinez MD, Ashley Pressley MD, Yoselyn Olguin MD, Juli Walker MD, Christy [facility], [facility], CNP, Julee Choi, CNP, Jerson Sarkar, CNS, Yoan Rodriguez, CNP, Acacia Pelletier, CNP, Eliel Douglas, CNP, Jennifer Brannon, CNP, Chrissie Rivera, CNP, Sage Hardin PA-C, Star Ling, [facility], Jason Carrillo, CNP, Adali Christianson, CNP, Colonel Tee, CNP, Matt Simpson, CNP, Marcus Yu, CNP, Justin Sylvester, CNP, Rosario Anderson, [facility]    Discharge Summary     Patient ID: Angeli Burt  :  1935   MRN: [id number]     ACCOUNT:  [de-identified]   Patient's PCP: Alessio Ratliff  Admit Date: 12/3/2021   Discharge Date: 2021     Length of Stay: 4  Code Status:  DNR-CCA  Admitting Physician: Raciel Vilchis DO  Discharge Physician: Raciel Vilchis DO     Active Discharge Diagnoses:     Hospital Problem Lists:  Principal Problem:    COVID  Active Problems:    Type 2 diabetes mellitus, without long-term current use of insulin ([facility]    Acquired hypothyroidism    Anal cancer ([facility]    Acute kidney injury superimposed on CKD ([facility]    CKD (chronic kidney disease) stage 3, GFR 30-59 ml/min (HCC)    Pneumonia of left lower lobe due to infectious organism    Renal insufficiency syndrome    Gastroesophageal reflux disease with esophagitis    COPD, mild ([facility]  Resolved Problems:    * No resolved hospital problems.  *      Admission Condition:  fair     Discharged Condition: stable    Hospital Stay:     Hospital Course:  Angeli Burt is a 81 y.o. male who was admitted for the management of  COVID , presented to ER with Fever (per EMS) and Fatigue (last 2-3 days. )    This is an 81-year-old male who presents with a complaint of shortness of breath and fatigue and generalized body aches. He was recently diagnosed with COVID-19 and receive monoclonal antibiotics as an outpatient. Chest x-ray did show continued nodular infiltrates. He did not have significant hypoxia but was found to have possible community-acquired pneumonia as well as started on azithromycin. He was improving steadily with treatment ultimately on the date of the eighth he was able be discharged to skilled facility. Significant therapeutic interventions: Azithromycin, aerosols    Significant Diagnostic Studies:   Labs / Micro:  CBC:   Lab Results   Component Value Date    WBC 10.3 12/08/2021    RBC 4.40 12/08/2021    HGB 12.3 12/08/2021    HCT 39.4 12/08/2021    MCV 89.5 12/08/2021    MCH 28.0 12/08/2021    MCHC 31.2 12/08/2021    RDW 14.8 12/08/2021     12/08/2021     BMP:    Lab Results   Component Value Date    GLUCOSE 198 12/08/2021     12/08/2021    K 4.9 12/08/2021     12/08/2021    CO2 22 12/08/2021    ANIONGAP 11 12/08/2021    BUN 60 12/08/2021    CREATININE 2.85 12/08/2021    BUNCRER 21 12/08/2021    CALCIUM 9.0 12/08/2021    LABGLOM 21 12/08/2021    GFRAA 26 12/08/2021    GFR      12/08/2021    GFR NOT REPORTED 12/08/2021        Radiology:  XR SCAPULA LEFT (COMPLETE)    Result Date: 12/4/2021  No acute osseous or soft tissue abnormality. XR ELBOW LEFT (MIN 3 VIEWS)    Result Date: 12/5/2021  No fracture or dislocation. Tiny, chipped osteophyte off of the olecranon process. CT HEAD WO CONTRAST    Result Date: 12/5/2021  Atrophy and chronic microvascular disease without acute intracranial abnormality. CT CHEST WO CONTRAST    Result Date: 12/4/2021  Few subtle nodular infiltrates peripherally in the right lower lobe and right middle lobe of uncertain etiology. This may relate to an infectious or inflammatory process although follow-up chest CT is recommended in 3 months to assess resolution. XR SHOULDER LEFT (MIN 2 VIEWS)    Result Date: 12/4/2021  Degenerative change without acute osseous or soft tissue abnormality. XR CHEST PORTABLE    Result Date: 12/7/2021  Marginal inspiration, without evidence of acute cardiopulmonary disease     XR CHEST PORTABLE    Result Date: 12/5/2021  Mild left basilar airspace opacities which could represent atelectasis and/or infiltrate. Consultations:    Consults:     Final Specialist Recommendations/Findings:   IP CONSULT TO INTERNAL MEDICINE  PHARMACY TO DOSE MEDICATION  IP CONSULT TO DIETITIAN  IP CONSULT TO SOCIAL WORK  IP CONSULT TO SOCIAL WORK  IP CONSULT TO NEUROLOGY      The patient was seen and examined on day of discharge and this discharge summary is in conjunction with any daily progress note from day of discharge. Discharge plan:     Disposition: Skilled facility    Physician Follow Up:   PCP 1 week  No follow-up provider specified. Requiring Further Evaluation/Follow Up POST HOSPITALIZATION/Incidental Findings:  Follow-up chest x-ray discretion of PCP    Diet: cardiac diet    Activity: As tolerated    Instructions to Patient: Take medication as prescribed    Discharge Medications:      Medication List      START taking these medications    benzonatate 100 MG capsule  Commonly known as: TESSALON  Take 1 capsule by mouth 3 times daily as needed for Cough     glucose 40 % Gel  Commonly known as: GLUTOSE  Take 37.5 mLs by mouth as needed (Hypoglycemia)     guaiFENesin-dextromethorphan 100-10 MG/5ML syrup  Commonly known as: ROBITUSSIN DM  Take 5 mLs by mouth every 4 hours as needed for Cough     metoprolol tartrate 25 MG tablet  Commonly known as: LOPRESSOR  Take 1 tablet by mouth 2 times daily     vitamin D 50 MCG (2000 UT) Tabs tablet  Commonly known as: CHOLECALCIFEROL  Take 1 tablet by mouth daily  Start taking on: December 9, 2021        CONTINUE taking these medications    glimepiride 4 MG tablet  Commonly known as: AMARYL     levothyroxine 88 MCG tablet  Commonly known as: SYNTHROID     pantoprazole 40 MG tablet  Commonly known as: PROTONIX     tolterodine 4 MG extended release capsule  Commonly known as: DETROL LA           Where to Get Your Medications      Information about where to get these medications is not yet available    Ask your nurse or doctor about these medications  · benzonatate 100 MG capsule  · glucose 40 % Gel  · guaiFENesin-dextromethorphan 100-10 MG/5ML syrup  · metoprolol tartrate 25 MG tablet  · vitamin D 50 MCG (2000 UT) Tabs tablet         No discharge procedures on file. Time Spent on discharge is  28 minutes in patient examination, evaluation, counseling as well as medication reconciliation, prescriptions for required medications, discharge plan and follow up. Electronically signed by   Fidencio Mulligan DO  12/8/2021  2:44 PM      Thank you Dr. Mehnaz Walton for the opportunity to be involved in this patient's care.

## 2021-12-08 NOTE — PLAN OF CARE
Problem: Falls - Risk of:  Goal: Will remain free from falls  Description: Will remain free from falls. Fall risk assessment completed. Patient instructed to use call light. Bed locked and in lowest position, side rails up 2/4, call light and bedside table within reach, clutter removed, and non-skid footwear on when pt out of bed. Hourly rounds will continue. Bed alarm in use.    Outcome: Ongoing     Problem: Gas Exchange - Impaired  Goal: Absence of hypoxia  Outcome: Ongoing

## 2021-12-08 NOTE — PROGRESS NOTES
Occupational Therapy  Facility/Department: [facility] PROGRESSIVE CARE  Daily Treatment Note  NAME: Angeli Burt  : 1935  MRN: [id number]    Date of Service: 2021    Discharge Recommendations:  Patient would benefit from continued therapy after discharge   Pt currently functioning below baseline. Would suggest additional therapy at time of discharge to maximize long term outcomes and prevent re-admission. Please refer to AM-PAC score for current level of function. Assessment   Performance deficits / Impairments: Decreased functional mobility ; Decreased ADL status; Decreased strength; Decreased safe awareness; Decreased cognition; Decreased endurance; Decreased balance; Decreased posture  Assessment: Pt req's A LOT assist at this time for all self care tasks, transfers, bed mobility and functional mobility. Pt is limited by cognitive deficits and overall decreased strength and activity tolerance. Skilled OT indicated to increase safety and IND with ADLs and to increase overall strength and activity tolerance to ensure a safe return to PLOF. Prognosis: Good  REQUIRES OT FOLLOW UP: Yes  Activity Tolerance  Activity Tolerance: Patient Tolerated treatment well; Patient limited by fatigue  Activity Tolerance: P+  Safety Devices  Safety Devices in place: Yes  Type of devices: All fall risk precautions in place; Left in bed; Bed alarm in place; Call light within reach; Nurse notified; Gait belt; Patient at risk for falls         Patient Diagnosis(es): The primary encounter diagnosis was COVID. A diagnosis of Pneumonia of left lower lobe due to infectious organism was also pertinent to this visit. has a past medical history of Anal cancer ([facility] CKD (chronic kidney disease), COPD (chronic obstructive pulmonary disease) ([facility] Diabetes mellitus ([facility] Hypothyroid, and Renal mass. has a past surgical history that includes Anus surgery; Cataract removal; Colonoscopy;  Upper gastrointestinal endoscopy; Rotator cuff repair; Knee arthroscopy; Vasectomy; and Finger trigger release. Restrictions  Restrictions/Precautions  Restrictions/Precautions: General Precautions, Fall Risk, Contact Precautions, Isolation, Up as Tolerated  Required Braces or Orthoses?: No  Position Activity Restriction  Other position/activity restrictions: IV, telemetry, cont SPO2 monitor, up with assist  Subjective   General  Chart Reviewed: Yes  Patient assessed for rehabilitation services?: Yes  Response to previous treatment: Patient with no complaints from previous session  Family / Caregiver Present: No  Subjective  Subjective: \"Am I going to choke to death and die on this? \" Referring to coughing up phlegm/coughing attack. Writer assured pt he was ok and to keep coughing to clear his lungs. Pt gets anxious easily. General Comment  Comments: Pt pleasant and cooperative. Orientation  Orientation  Overall Orientation Status: Within Functional Limits  Objective             Balance  Sitting Balance: Stand by assistance  Standing Balance: Moderate assistance  Standing Balance  Time: 2-3 min  Activity: mobility  Functional Mobility  Functional - Mobility Device: Rolling Walker  Activity: Other  Assist Level: Moderate assistance  Functional Mobility Comments: Pt completed functional mobility to/from bathroom and around room using RW with MOD A (level of assist varied from CGA-MIN-MOD A) for balance and safety. Pt req'd MAX verbal/tactile cues for upright posture, proper walker mgmt/use and overal safety. Bed mobility  Supine to Sit: Minimal assistance; Moderate assistance  Sit to Supine: Minimal assistance; Moderate assistance  Comment: Max verbal, physical and hand over hand cues provided for sequencing movements, use of bed rail and overall safety. Transfers  Stand Step Transfers: Moderate assistance  Sit to stand:  Moderate assistance  Stand to sit: Moderate assistance  Transfer Comments: Max verbal/tactile cues for hand placement, nose over toes, keeping walker close, squaring self up to surface prior to sitting, upright posture and overall safety. Cognition  Overall Cognitive Status: Exceptions  Arousal/Alertness: Appropriate responses to stimuli  Following Commands: Follows one step commands with increased time; Follows one step commands with repetition; Inconsistently follows commands  Attention Span: Appears intact; Attends with cues to redirect  Memory: Decreased short term memory; Decreased recall of recent events  Safety Judgement: Decreased awareness of need for assistance; Decreased awareness of need for safety  Problem Solving: Decreased awareness of errors; Assistance required to identify errors made; Assistance required to generate solutions; Assistance required to implement solutions; Assistance required to correct errors made  Insights: Decreased awareness of deficits  Initiation: Requires cues for all  Sequencing: Requires cues for all  Cognition Comment: Pt is [place] making direction following difficult at times. Perception  Overall Perceptual Status: Impaired  Initiation: Cues to initiate tasks              Type of ROM/Therapeutic Exercise  Type of ROM/Therapeutic Exercise: AROM  Comment: Pt sat EOB to complete simple B UE AROM exercises in all planes x25 reps of each exercise. Verbal/visual cues for technique with fair follow thru. Pt coughs a lot during activity so freq rest breaks were req'd.                     Plan   Plan  Times per week: 4-5x/week, 1-2x/day  Current Treatment Recommendations: Strengthening, Balance Training, Functional Mobility Training, Endurance Training, Safety Education & Training, Self-Care / ADL, Patient/Caregiver Education & Training, Equipment Evaluation, Education, & procurement, Positioning, Cognitive/Perceptual Training                                   AM-PAC Score        [facility] Inpatient Daily Activity Raw Score: 14 (12/08/21 8273)  AM-PAC Inpatient ADL T-Scale Score :

## 2021-12-08 NOTE — PROGRESS NOTES
Physical Therapy  Facility/Department: [facility] PROGRESSIVE CARE  Daily Treatment Note  NAME: Victor Hugo Alejo  : 1935  MRN: [id number]    Date of Service: 2021    Discharge Recommendations:  Patient would benefit from continued therapy after discharge     Pt currently functioning below baseline. Would suggest additional therapy at time of discharge to maximize long term outcomes and prevent re-admission. Please refer to AM-PAC score for current level of function. Assessment   Body structures, Functions, Activity limitations: Decreased functional mobility ; Decreased strength; Decreased safe awareness; Decreased balance; Decreased endurance; Decreased cognition; Decreased posture  Assessment: Pt tolerated session fair. Pt making progress towards STGs but continues to demonstrate significant difficulty throughout mobility at this time. Pt currently requires skilled 2 person assist for safe functional mobility at this time. Pt is a HIGH fall risk d/t decreased safety awareness, decreased balance, and decreased strength. Pt would benefit from continued skilled physical therapy to address these deficits in order to return to PLOF. Prognosis: Good  Decision Making: High Complexity  PT Education: PT Role; Energy Conservation; General Safety; Functional Mobility Training; Transfer Training; Gait Training  Patient Education: Pt educated on: safe transfers w/ RW, general safety awareness, prevention of sedentary complications, pursed lip breathing, activity tolerance, and PT POC. Pt verbalized fair understanding. Pt would benefit from continued reinforcement of education. REQUIRES PT FOLLOW UP: Yes  Activity Tolerance  Activity Tolerance: Patient limited by cognitive status; Patient limited by endurance     Patient Diagnosis(es): The primary encounter diagnosis was COVID. A diagnosis of Pneumonia of left lower lobe due to infectious organism was also pertinent to this visit.      has a past medical history of Anal cancer ([facility] CKD (chronic kidney disease), COPD (chronic obstructive pulmonary disease) ([facility] mellitus ([email or web address] Hypothyroid, and Renal mass. has a past surgical history that includes Anus surgery; Cataract removal; Colonoscopy; Upper gastrointestinal endoscopy; Rotator cuff repair; Knee arthroscopy; Vasectomy; and Finger trigger release. Restrictions  Restrictions/Precautions  Restrictions/Precautions: General Precautions, Fall Risk, Contact Precautions, Isolation, Up as Tolerated  Required Braces or Orthoses?: No  Position Activity Restriction  Other position/activity restrictions: IV, telemetry, cont SPO2 monitor, up with assist  Subjective   General  Chart Reviewed: Yes  Response To Previous Treatment: Patient with no complaints from previous session. Family / Caregiver Present: No  Subjective  Subjective: Patient agreeable for PT treatment. General Comment  Comments: RN and pt agreeable to therapy. Pt supine in bed upon arrival on room air w/ SpO2 95%. Pt pleasant cooperative throughout. Orientation  Orientation  Overall Orientation Status: Within Functional Limits  Cognition   Cognition  Overall Cognitive Status: Exceptions  Arousal/Alertness: Appropriate responses to stimuli  Following Commands: Follows one step commands with increased time; Follows one step commands with repetition; Inconsistently follows commands  Attention Span: Appears intact;  Attends with cues to redirect  Memory: Decreased short term memory; Decreased recall of recent events  Safety Judgement: Decreased awareness of need for assistance; Decreased awareness of need for safety  Problem Solving: Decreased awareness of errors; Assistance required to identify errors made; Assistance required to generate solutions; Assistance required to implement solutions; Assistance required to correct errors made  Insights: Decreased awareness of deficits  Initiation: Requires cues for all  Sequencing: Requires cues for all  Cognition Comment: Pt is [place] making direction following difficult at times. Objective   Bed mobility  Rolling to Right: Minimal assistance  Supine to Sit: Moderate assistance  Sit to Supine: Moderate assistance  Scooting: Minimal assistance  Comment: MAX VCS and hand over hand assist for use of bed rail, hand placement and sequencing mvmts. Transfers  Sit to Stand: Moderate Assistance  Stand to sit: Moderate Assistance  Bed to Chair: Moderate assistance  Comment: STS x 3 reps with seated rest break x 4 mins with VCs for pursed lip breathing. Ambulation  More Ambulation?: No  Ambulation 1  Surface: level tile  Device: Rolling Walker  Assistance: Moderate assistance; 2 Person assistance  Quality of Gait: poor steadiness, poor safety awareness, assist for progression of RW throughout  Gait Deviations: Slow Nany; Decreased step length; Decreased step height; Shuffles; Staggers  Distance: 20ft x 4  Comments: Pt ambulating w/ poor steadiness this date requiring assist w/ progression of RW throughout. Pt also demonstrating increased shakiness this date and requiring MAX verbal cueing for sequencing throughout. SpO2 WNL throughout ambulation.   Stairs/Curb  Stairs?: No     Balance  Posture: Fair  Sitting - Static: Fair; +  Sitting - Dynamic: Fair; +  Standing - Static: Fair; -  Standing - Dynamic: Poor; +  Comments: Standing balance assessed w/ RW    AM-PAC Score  AM-PAC Inpatient Mobility Raw Score : 13 (12/07/21)  AM-PAC Inpatient T-Scale Score : 36.74 (12/07/21)  Mobility Inpatient CMS 0-100% Score: 64.91 (12/07/21)  Mobility Inpatient CMS G-Code Modifier : CL (12/07/21)          Goals  Short term goals  Time Frame for Short term goals: 14 visits  Short term goal 1: Pt to demonstrate bed mobility Kim  Short term goal 2: Pt to perform STS transfers w/ RW Kim  Short term goal 3: Pt to ambulate at least 150ft w/ RW w/ Kim  Short term goal 4: Pt to actively participate in at least 30 minutes of physical therapy for ther act, ther ex, balance, gait, and transfer training  Short term goal 5: Pt to ascend/descend 5 stairs w/ handrails ModA  Patient Goals   Patient goals :  To go home    Plan    Plan  Times per week: 1-2x/day, 5-6x/week  Current Treatment Recommendations: Strengthening, Balance Training, Functional Mobility Training, Stair training, Gait Training, Transfer Training, Endurance Training, Safety Education & Training, Home Exercise Program, Equipment Evaluation, Education, & procurement, Patient/Caregiver Education & Training  Safety Devices  Type of devices: Call light within reach, Chair alarm in place, Gait belt, Nurse notified, [place] Judy in use, Left in chair  Restraints  Initially in place: No     Therapy Time   Individual Concurrent Group Co-treatment   Time In 1438         Time Out 1510         [facility] [place], [place]

## 2021-12-09 LAB
CULTURE: NORMAL
CULTURE: NORMAL
Lab: NORMAL
Lab: NORMAL
SPECIMEN DESCRIPTION: NORMAL
SPECIMEN DESCRIPTION: NORMAL

## 2021-12-09 NOTE — PROGRESS NOTES
.All patient belongings collected. IV and telemetry removed. Patient taken by [facility] via EMS.